# Patient Record
Sex: FEMALE | Race: WHITE | NOT HISPANIC OR LATINO | Employment: FULL TIME | URBAN - METROPOLITAN AREA
[De-identification: names, ages, dates, MRNs, and addresses within clinical notes are randomized per-mention and may not be internally consistent; named-entity substitution may affect disease eponyms.]

---

## 2017-01-21 ENCOUNTER — HOSPITAL ENCOUNTER (EMERGENCY)
Facility: HOSPITAL | Age: 32
Discharge: HOME | End: 2017-01-21
Attending: EMERGENCY MEDICINE | Admitting: EMERGENCY MEDICINE
Payer: COMMERCIAL

## 2017-01-21 ENCOUNTER — OFFICE VISIT (OUTPATIENT)
Dept: URGENT CARE | Facility: MEDICAL CENTER | Age: 32
End: 2017-01-21
Payer: COMMERCIAL

## 2017-01-21 ENCOUNTER — APPOINTMENT (EMERGENCY)
Dept: CT IMAGING | Facility: HOSPITAL | Age: 32
End: 2017-01-21
Payer: COMMERCIAL

## 2017-01-21 VITALS
DIASTOLIC BLOOD PRESSURE: 82 MMHG | BODY MASS INDEX: 29.97 KG/M2 | SYSTOLIC BLOOD PRESSURE: 142 MMHG | OXYGEN SATURATION: 96 % | RESPIRATION RATE: 16 BRPM | WEIGHT: 180.12 LBS | HEART RATE: 85 BPM | TEMPERATURE: 98.6 F

## 2017-01-21 DIAGNOSIS — N39.0 URINARY TRACT INFECTION: Primary | ICD-10-CM

## 2017-01-21 LAB
ALBUMIN SERPL BCP-MCNC: 4 G/DL (ref 3.5–5)
ALP SERPL-CCNC: 110 U/L (ref 46–116)
ALT SERPL W P-5'-P-CCNC: 52 U/L (ref 12–78)
ANION GAP SERPL CALCULATED.3IONS-SCNC: 13 MMOL/L (ref 4–13)
AST SERPL W P-5'-P-CCNC: 28 U/L (ref 5–45)
BACTERIA UR QL AUTO: ABNORMAL /HPF
BASOPHILS # BLD AUTO: 0.05 THOUSANDS/ΜL (ref 0–0.1)
BASOPHILS NFR BLD AUTO: 0 % (ref 0–1)
BILIRUB SERPL-MCNC: 0.3 MG/DL (ref 0.2–1)
BILIRUB UR QL STRIP: NEGATIVE
BUN SERPL-MCNC: 16 MG/DL (ref 5–25)
CALCIUM SERPL-MCNC: 8.9 MG/DL (ref 8.3–10.1)
CHLORIDE SERPL-SCNC: 104 MMOL/L (ref 100–108)
CLARITY UR: CLEAR
CLARITY, POC: CLEAR
CO2 SERPL-SCNC: 24 MMOL/L (ref 21–32)
COLOR UR: YELLOW
COLOR, POC: YELLOW
CREAT SERPL-MCNC: 0.77 MG/DL (ref 0.6–1.3)
EOSINOPHIL # BLD AUTO: 0.06 THOUSAND/ΜL (ref 0–0.61)
EOSINOPHIL NFR BLD AUTO: 1 % (ref 0–6)
ERYTHROCYTE [DISTWIDTH] IN BLOOD BY AUTOMATED COUNT: 12.6 % (ref 11.6–15.1)
EXT BILIRUBIN, UA: NEGATIVE
EXT BLOOD URINE: NORMAL
EXT GLUCOSE, UA: NEGATIVE
EXT KETONES: NEGATIVE
EXT NITRITE, UA: NEGATIVE
EXT PH, UA: 7
EXT PROTEIN, UA: NEGATIVE
EXT SPECIFIC GRAVITY, UA: 1
EXT UROBILINOGEN: 0.02
GFR SERPL CREATININE-BSD FRML MDRD: >60 ML/MIN/1.73SQ M
GLUCOSE SERPL-MCNC: 100 MG/DL (ref 65–140)
GLUCOSE UR STRIP-MCNC: NEGATIVE MG/DL
HCG UR QL: NEGATIVE
HCT VFR BLD AUTO: 38.7 % (ref 34.8–46.1)
HGB BLD-MCNC: 13 G/DL (ref 11.5–15.4)
HGB UR QL STRIP.AUTO: ABNORMAL
KETONES UR STRIP-MCNC: NEGATIVE MG/DL
LEUKOCYTE ESTERASE UR QL STRIP: ABNORMAL
LIPASE SERPL-CCNC: 118 U/L (ref 73–393)
LYMPHOCYTES # BLD AUTO: 1.71 THOUSANDS/ΜL (ref 0.6–4.47)
LYMPHOCYTES NFR BLD AUTO: 14 % (ref 14–44)
MCH RBC QN AUTO: 28.2 PG (ref 26.8–34.3)
MCHC RBC AUTO-ENTMCNC: 33.6 G/DL (ref 31.4–37.4)
MCV RBC AUTO: 84 FL (ref 82–98)
MONOCYTES # BLD AUTO: 1.11 THOUSAND/ΜL (ref 0.17–1.22)
MONOCYTES NFR BLD AUTO: 9 % (ref 4–12)
NEUTROPHILS # BLD AUTO: 9.04 THOUSANDS/ΜL (ref 1.85–7.62)
NEUTS SEG NFR BLD AUTO: 76 % (ref 43–75)
NITRITE UR QL STRIP: NEGATIVE
NON-SQ EPI CELLS URNS QL MICRO: ABNORMAL /HPF
PH UR STRIP.AUTO: 6.5 [PH] (ref 4.5–8)
PLATELET # BLD AUTO: 274 THOUSANDS/UL (ref 149–390)
PMV BLD AUTO: 10.7 FL (ref 8.9–12.7)
POTASSIUM SERPL-SCNC: 3.8 MMOL/L (ref 3.5–5.3)
PROT SERPL-MCNC: 7.9 G/DL (ref 6.4–8.2)
PROT UR STRIP-MCNC: NEGATIVE MG/DL
RBC # BLD AUTO: 4.61 MILLION/UL (ref 3.81–5.12)
RBC #/AREA URNS AUTO: ABNORMAL /HPF
SODIUM SERPL-SCNC: 141 MMOL/L (ref 136–145)
SP GR UR STRIP.AUTO: <=1.005 (ref 1–1.03)
UROBILINOGEN UR QL STRIP.AUTO: 0.2 E.U./DL
WBC # BLD AUTO: 11.97 THOUSAND/UL (ref 4.31–10.16)
WBC # BLD EST: NORMAL 10*3/UL
WBC #/AREA URNS AUTO: ABNORMAL /HPF

## 2017-01-21 PROCEDURE — 81025 URINE PREGNANCY TEST: CPT | Performed by: PHYSICIAN ASSISTANT

## 2017-01-21 PROCEDURE — 99285 EMERGENCY DEPT VISIT HI MDM: CPT

## 2017-01-21 PROCEDURE — 96361 HYDRATE IV INFUSION ADD-ON: CPT

## 2017-01-21 PROCEDURE — 74176 CT ABD & PELVIS W/O CONTRAST: CPT

## 2017-01-21 PROCEDURE — 36415 COLL VENOUS BLD VENIPUNCTURE: CPT | Performed by: PHYSICIAN ASSISTANT

## 2017-01-21 PROCEDURE — 83690 ASSAY OF LIPASE: CPT | Performed by: PHYSICIAN ASSISTANT

## 2017-01-21 PROCEDURE — 80053 COMPREHEN METABOLIC PANEL: CPT | Performed by: PHYSICIAN ASSISTANT

## 2017-01-21 PROCEDURE — 81002 URINALYSIS NONAUTO W/O SCOPE: CPT | Performed by: PHYSICIAN ASSISTANT

## 2017-01-21 PROCEDURE — 85025 COMPLETE CBC W/AUTO DIFF WBC: CPT | Performed by: PHYSICIAN ASSISTANT

## 2017-01-21 PROCEDURE — 96360 HYDRATION IV INFUSION INIT: CPT

## 2017-01-21 PROCEDURE — 87086 URINE CULTURE/COLONY COUNT: CPT | Performed by: EMERGENCY MEDICINE

## 2017-01-21 PROCEDURE — 81001 URINALYSIS AUTO W/SCOPE: CPT | Performed by: PHYSICIAN ASSISTANT

## 2017-01-21 RX ORDER — CEPHALEXIN 500 MG/1
500 CAPSULE ORAL 3 TIMES DAILY
Qty: 21 CAPSULE | Refills: 0 | Status: SHIPPED | OUTPATIENT
Start: 2017-01-21 | End: 2017-01-28

## 2017-01-21 RX ADMIN — SODIUM CHLORIDE 1000 ML: 0.9 INJECTION, SOLUTION INTRAVENOUS at 16:53

## 2017-01-23 ENCOUNTER — ALLSCRIPTS OFFICE VISIT (OUTPATIENT)
Dept: OTHER | Facility: OTHER | Age: 32
End: 2017-01-23

## 2017-01-23 LAB — BACTERIA UR CULT: NORMAL

## 2017-01-24 ENCOUNTER — APPOINTMENT (EMERGENCY)
Dept: CT IMAGING | Facility: HOSPITAL | Age: 32
End: 2017-01-24
Payer: COMMERCIAL

## 2017-01-24 ENCOUNTER — HOSPITAL ENCOUNTER (EMERGENCY)
Facility: HOSPITAL | Age: 32
Discharge: HOME | End: 2017-01-24
Attending: EMERGENCY MEDICINE | Admitting: EMERGENCY MEDICINE
Payer: COMMERCIAL

## 2017-01-24 VITALS
RESPIRATION RATE: 18 BRPM | DIASTOLIC BLOOD PRESSURE: 80 MMHG | OXYGEN SATURATION: 97 % | HEART RATE: 86 BPM | BODY MASS INDEX: 28.46 KG/M2 | TEMPERATURE: 98.2 F | WEIGHT: 171 LBS | SYSTOLIC BLOOD PRESSURE: 127 MMHG

## 2017-01-24 DIAGNOSIS — R11.0 NAUSEA: Primary | ICD-10-CM

## 2017-01-24 DIAGNOSIS — R10.9 RIGHT FLANK PAIN: ICD-10-CM

## 2017-01-24 DIAGNOSIS — R19.7 DIARRHEA: ICD-10-CM

## 2017-01-24 DIAGNOSIS — N83.201 OVARIAN CYST, RIGHT: ICD-10-CM

## 2017-01-24 LAB
ANION GAP SERPL CALCULATED.3IONS-SCNC: 11 MMOL/L (ref 4–13)
BACTERIA UR QL AUTO: ABNORMAL /HPF
BASOPHILS # BLD AUTO: 0.08 THOUSANDS/ΜL (ref 0–0.1)
BASOPHILS NFR BLD AUTO: 1 % (ref 0–1)
BILIRUB UR QL STRIP: NEGATIVE
BUN SERPL-MCNC: 14 MG/DL (ref 5–25)
CALCIUM SERPL-MCNC: 8.6 MG/DL (ref 8.3–10.1)
CHLORIDE SERPL-SCNC: 104 MMOL/L (ref 100–108)
CLARITY UR: CLEAR
CO2 SERPL-SCNC: 24 MMOL/L (ref 21–32)
COLOR UR: YELLOW
CREAT SERPL-MCNC: 0.71 MG/DL (ref 0.6–1.3)
EOSINOPHIL # BLD AUTO: 0.28 THOUSAND/ΜL (ref 0–0.61)
EOSINOPHIL NFR BLD AUTO: 3 % (ref 0–6)
ERYTHROCYTE [DISTWIDTH] IN BLOOD BY AUTOMATED COUNT: 12.9 % (ref 11.6–15.1)
GFR SERPL CREATININE-BSD FRML MDRD: >60 ML/MIN/1.73SQ M
GLUCOSE SERPL-MCNC: 101 MG/DL (ref 65–140)
GLUCOSE UR STRIP-MCNC: NEGATIVE MG/DL
HCT VFR BLD AUTO: 45.2 % (ref 34.8–46.1)
HGB BLD-MCNC: 15.3 G/DL (ref 11.5–15.4)
HGB UR QL STRIP.AUTO: ABNORMAL
KETONES UR STRIP-MCNC: NEGATIVE MG/DL
LEUKOCYTE ESTERASE UR QL STRIP: NEGATIVE
LYMPHOCYTES # BLD AUTO: 1.62 THOUSANDS/ΜL (ref 0.6–4.47)
LYMPHOCYTES NFR BLD AUTO: 19 % (ref 14–44)
MCH RBC QN AUTO: 28 PG (ref 26.8–34.3)
MCHC RBC AUTO-ENTMCNC: 33.8 G/DL (ref 31.4–37.4)
MCV RBC AUTO: 83 FL (ref 82–98)
MONOCYTES # BLD AUTO: 1.16 THOUSAND/ΜL (ref 0.17–1.22)
MONOCYTES NFR BLD AUTO: 14 % (ref 4–12)
NEUTROPHILS # BLD AUTO: 5.3 THOUSANDS/ΜL (ref 1.85–7.62)
NEUTS SEG NFR BLD AUTO: 63 % (ref 43–75)
NITRITE UR QL STRIP: NEGATIVE
NON-SQ EPI CELLS URNS QL MICRO: ABNORMAL /HPF
PH UR STRIP.AUTO: 5.5 [PH] (ref 4.5–8)
PLATELET # BLD AUTO: 285 THOUSANDS/UL (ref 149–390)
PMV BLD AUTO: 10.7 FL (ref 8.9–12.7)
POTASSIUM SERPL-SCNC: 3.4 MMOL/L (ref 3.5–5.3)
PROT UR STRIP-MCNC: ABNORMAL MG/DL
RBC # BLD AUTO: 5.46 MILLION/UL (ref 3.81–5.12)
RBC #/AREA URNS AUTO: ABNORMAL /HPF
SODIUM SERPL-SCNC: 139 MMOL/L (ref 136–145)
SP GR UR STRIP.AUTO: >=1.03 (ref 1–1.03)
UROBILINOGEN UR QL STRIP.AUTO: 0.2 E.U./DL
WBC # BLD AUTO: 8.44 THOUSAND/UL (ref 4.31–10.16)
WBC #/AREA URNS AUTO: ABNORMAL /HPF

## 2017-01-24 PROCEDURE — 87086 URINE CULTURE/COLONY COUNT: CPT | Performed by: EMERGENCY MEDICINE

## 2017-01-24 PROCEDURE — 36415 COLL VENOUS BLD VENIPUNCTURE: CPT | Performed by: EMERGENCY MEDICINE

## 2017-01-24 PROCEDURE — 85025 COMPLETE CBC W/AUTO DIFF WBC: CPT | Performed by: EMERGENCY MEDICINE

## 2017-01-24 PROCEDURE — 81001 URINALYSIS AUTO W/SCOPE: CPT | Performed by: EMERGENCY MEDICINE

## 2017-01-24 PROCEDURE — 99284 EMERGENCY DEPT VISIT MOD MDM: CPT

## 2017-01-24 PROCEDURE — 80048 BASIC METABOLIC PNL TOTAL CA: CPT | Performed by: EMERGENCY MEDICINE

## 2017-01-24 PROCEDURE — 96360 HYDRATION IV INFUSION INIT: CPT

## 2017-01-24 PROCEDURE — 96361 HYDRATE IV INFUSION ADD-ON: CPT

## 2017-01-24 PROCEDURE — 74176 CT ABD & PELVIS W/O CONTRAST: CPT

## 2017-01-24 RX ORDER — ONDANSETRON 4 MG/1
4 TABLET, FILM COATED ORAL EVERY 8 HOURS PRN
Qty: 6 TABLET | Refills: 0 | Status: SHIPPED | OUTPATIENT
Start: 2017-01-24 | End: 2018-01-29 | Stop reason: HOSPADM

## 2017-01-24 RX ADMIN — SODIUM CHLORIDE 1000 ML: 0.9 INJECTION, SOLUTION INTRAVENOUS at 19:49

## 2017-01-25 LAB — BACTERIA UR CULT: NORMAL

## 2017-01-27 DIAGNOSIS — N83.209 CYST OF OVARY: ICD-10-CM

## 2017-02-01 ENCOUNTER — TRANSCRIBE ORDERS (OUTPATIENT)
Dept: ADMINISTRATIVE | Facility: HOSPITAL | Age: 32
End: 2017-02-01

## 2017-02-01 ENCOUNTER — HOSPITAL ENCOUNTER (OUTPATIENT)
Dept: ULTRASOUND IMAGING | Facility: HOSPITAL | Age: 32
Discharge: HOME | End: 2017-02-01
Attending: OBSTETRICS & GYNECOLOGY
Payer: COMMERCIAL

## 2017-02-01 DIAGNOSIS — N83.209 CYST OF OVARY: ICD-10-CM

## 2017-02-01 PROCEDURE — 76856 US EXAM PELVIC COMPLETE: CPT

## 2017-02-01 PROCEDURE — 76830 TRANSVAGINAL US NON-OB: CPT

## 2017-03-29 ENCOUNTER — ALLSCRIPTS OFFICE VISIT (OUTPATIENT)
Dept: OTHER | Facility: OTHER | Age: 32
End: 2017-03-29

## 2017-03-30 ENCOUNTER — ALLSCRIPTS OFFICE VISIT (OUTPATIENT)
Dept: OTHER | Facility: OTHER | Age: 32
End: 2017-03-30

## 2017-05-26 DIAGNOSIS — Z34.81 ENCOUNTER FOR SUPERVISION OF OTHER NORMAL PREGNANCY, FIRST TRIMESTER: ICD-10-CM

## 2017-05-27 ENCOUNTER — APPOINTMENT (OUTPATIENT)
Dept: LAB | Facility: CLINIC | Age: 32
End: 2017-05-27
Payer: COMMERCIAL

## 2017-05-27 DIAGNOSIS — Z34.81 ENCOUNTER FOR SUPERVISION OF OTHER NORMAL PREGNANCY, FIRST TRIMESTER: ICD-10-CM

## 2017-05-27 LAB
ABO GROUP BLD: NORMAL
B-HCG SERPL-ACNC: 433 MIU/ML
BLD GP AB SCN SERPL QL: NEGATIVE
PROGEST SERPL-MCNC: 19.2 NG/ML
RH BLD: NEGATIVE
SPECIMEN EXPIRATION DATE: NORMAL

## 2017-05-27 PROCEDURE — 86900 BLOOD TYPING SEROLOGIC ABO: CPT

## 2017-05-27 PROCEDURE — 84702 CHORIONIC GONADOTROPIN TEST: CPT

## 2017-05-27 PROCEDURE — 36415 COLL VENOUS BLD VENIPUNCTURE: CPT

## 2017-05-27 PROCEDURE — 86901 BLOOD TYPING SEROLOGIC RH(D): CPT

## 2017-05-27 PROCEDURE — 84144 ASSAY OF PROGESTERONE: CPT

## 2017-05-27 PROCEDURE — 86850 RBC ANTIBODY SCREEN: CPT

## 2017-05-30 ENCOUNTER — APPOINTMENT (OUTPATIENT)
Dept: LAB | Facility: CLINIC | Age: 32
End: 2017-05-30
Payer: COMMERCIAL

## 2017-05-30 ENCOUNTER — TRANSCRIBE ORDERS (OUTPATIENT)
Dept: LAB | Facility: CLINIC | Age: 32
End: 2017-05-30

## 2017-05-30 DIAGNOSIS — Z34.81 ENCOUNTER FOR SUPERVISION OF OTHER NORMAL PREGNANCY, FIRST TRIMESTER: ICD-10-CM

## 2017-05-30 LAB — B-HCG SERPL-ACNC: 1746 MIU/ML

## 2017-05-30 PROCEDURE — 84702 CHORIONIC GONADOTROPIN TEST: CPT

## 2017-06-21 ENCOUNTER — ALLSCRIPTS OFFICE VISIT (OUTPATIENT)
Dept: OTHER | Facility: OTHER | Age: 32
End: 2017-06-21

## 2017-06-21 LAB
EXTERNAL CHLAMYDIA SCREEN: NORMAL
EXTERNAL GONORRHEA SCREEN: NORMAL

## 2017-06-24 ENCOUNTER — LAB CONVERSION - ENCOUNTER (OUTPATIENT)
Dept: OTHER | Facility: OTHER | Age: 32
End: 2017-06-24

## 2017-06-24 LAB — CULT RSLT GENITAL (HISTORICAL): NORMAL

## 2017-06-26 ENCOUNTER — LAB CONVERSION - ENCOUNTER (OUTPATIENT)
Dept: OTHER | Facility: OTHER | Age: 32
End: 2017-06-26

## 2017-06-26 LAB
CHLAMYDIA, LIQUID-BASED (HISTORICAL): NOT DETECTED
GC BY MOL. METHOD (HISTORICAL): NOT DETECTED
HPV 18 (HISTORICAL): NOT DETECTED
HPV HIGH RISK 16/18 (HISTORICAL): NOT DETECTED
HPV16 (HISTORICAL): NOT DETECTED
PAP (HISTORICAL): NORMAL
TRICHOMONAS (HISTORICAL): NOT DETECTED

## 2017-07-01 ENCOUNTER — APPOINTMENT (OUTPATIENT)
Dept: LAB | Facility: CLINIC | Age: 32
End: 2017-07-01
Payer: COMMERCIAL

## 2017-07-01 ENCOUNTER — TRANSCRIBE ORDERS (OUTPATIENT)
Dept: LAB | Facility: CLINIC | Age: 32
End: 2017-07-01

## 2017-07-01 DIAGNOSIS — Z34.81 ENCOUNTER FOR SUPERVISION OF OTHER NORMAL PREGNANCY, FIRST TRIMESTER: ICD-10-CM

## 2017-07-01 DIAGNOSIS — Z34.81 PRENATAL CARE, SUBSEQUENT PREGNANCY, FIRST TRIMESTER: Primary | ICD-10-CM

## 2017-07-01 LAB
ABO GROUP BLD: NORMAL
ALBUMIN SERPL BCP-MCNC: 3.6 G/DL (ref 3.5–5)
ALP SERPL-CCNC: 103 U/L (ref 46–116)
ALT SERPL W P-5'-P-CCNC: 45 U/L (ref 12–78)
ANION GAP SERPL CALCULATED.3IONS-SCNC: 11 MMOL/L (ref 4–13)
AST SERPL W P-5'-P-CCNC: 23 U/L (ref 5–45)
BASOPHILS # BLD AUTO: 0.04 THOUSANDS/ΜL (ref 0–0.1)
BASOPHILS NFR BLD AUTO: 1 % (ref 0–1)
BILIRUB SERPL-MCNC: 0.4 MG/DL (ref 0.2–1)
BILIRUB UR QL STRIP: NEGATIVE
BLD GP AB SCN SERPL QL: NEGATIVE
BUN SERPL-MCNC: 10 MG/DL (ref 5–25)
CALCIUM SERPL-MCNC: 9 MG/DL (ref 8.3–10.1)
CFTR MUT ANL BLD/T: NORMAL
CHLORIDE SERPL-SCNC: 100 MMOL/L (ref 100–108)
CLARITY UR: CLEAR
CO2 SERPL-SCNC: 25 MMOL/L (ref 21–32)
COLOR UR: COLORLESS
CREAT SERPL-MCNC: 0.75 MG/DL (ref 0.6–1.3)
CREAT UR-MCNC: <13 MG/DL
EOSINOPHIL # BLD AUTO: 0.03 THOUSAND/ΜL (ref 0–0.61)
EOSINOPHIL NFR BLD AUTO: 0 % (ref 0–6)
ERYTHROCYTE [DISTWIDTH] IN BLOOD BY AUTOMATED COUNT: 13.1 % (ref 11.6–15.1)
GFR SERPL CREATININE-BSD FRML MDRD: >60 ML/MIN/1.73SQ M
GLUCOSE 1H P 50 G GLC PO SERPL-MCNC: 140 MG/DL
GLUCOSE SERPL-MCNC: 141 MG/DL (ref 65–140)
GLUCOSE UR STRIP-MCNC: NEGATIVE MG/DL
HCT VFR BLD AUTO: 39.7 % (ref 34.8–46.1)
HGB BLD-MCNC: 13.5 G/DL (ref 11.5–15.4)
HGB UR QL STRIP.AUTO: NEGATIVE
KETONES UR STRIP-MCNC: NEGATIVE MG/DL
LEUKOCYTE ESTERASE UR QL STRIP: NEGATIVE
LYMPHOCYTES # BLD AUTO: 1.48 THOUSANDS/ΜL (ref 0.6–4.47)
LYMPHOCYTES NFR BLD AUTO: 18 % (ref 14–44)
MCH RBC QN AUTO: 28.5 PG (ref 26.8–34.3)
MCHC RBC AUTO-ENTMCNC: 34 G/DL (ref 31.4–37.4)
MCV RBC AUTO: 84 FL (ref 82–98)
MONOCYTES # BLD AUTO: 0.54 THOUSAND/ΜL (ref 0.17–1.22)
MONOCYTES NFR BLD AUTO: 7 % (ref 4–12)
NEUTROPHILS # BLD AUTO: 6.08 THOUSANDS/ΜL (ref 1.85–7.62)
NEUTS SEG NFR BLD AUTO: 74 % (ref 43–75)
NITRITE UR QL STRIP: NEGATIVE
PH UR STRIP.AUTO: 6.5 [PH] (ref 4.5–8)
PLATELET # BLD AUTO: 259 THOUSANDS/UL (ref 149–390)
PMV BLD AUTO: 10.8 FL (ref 8.9–12.7)
POTASSIUM SERPL-SCNC: 3.3 MMOL/L (ref 3.5–5.3)
PROT SERPL-MCNC: 7.8 G/DL (ref 6.4–8.2)
PROT UR STRIP-MCNC: NEGATIVE MG/DL
PROT UR-MCNC: <6 MG/DL
RBC # BLD AUTO: 4.73 MILLION/UL (ref 3.81–5.12)
RH BLD: NEGATIVE
RUBV IGG SERPL IA-ACNC: 61.6 IU/ML
SODIUM SERPL-SCNC: 136 MMOL/L (ref 136–145)
SP GR UR STRIP.AUTO: <=1.005 (ref 1–1.03)
SPECIMEN EXPIRATION DATE: NORMAL
URATE SERPL-MCNC: 3.3 MG/DL (ref 2–6.8)
UROBILINOGEN UR QL STRIP.AUTO: 0.2 E.U./DL
WBC # BLD AUTO: 8.17 THOUSAND/UL (ref 4.31–10.16)

## 2017-07-01 PROCEDURE — 80081 OBSTETRIC PANEL INC HIV TSTG: CPT

## 2017-07-01 PROCEDURE — 36415 COLL VENOUS BLD VENIPUNCTURE: CPT

## 2017-07-01 PROCEDURE — 84156 ASSAY OF PROTEIN URINE: CPT

## 2017-07-01 PROCEDURE — 86803 HEPATITIS C AB TEST: CPT

## 2017-07-01 PROCEDURE — 81003 URINALYSIS AUTO W/O SCOPE: CPT

## 2017-07-01 PROCEDURE — 82570 ASSAY OF URINE CREATININE: CPT

## 2017-07-01 PROCEDURE — 84550 ASSAY OF BLOOD/URIC ACID: CPT

## 2017-07-01 PROCEDURE — 82950 GLUCOSE TEST: CPT

## 2017-07-01 PROCEDURE — 80053 COMPREHEN METABOLIC PANEL: CPT

## 2017-07-02 LAB
HBV SURFACE AG SER QL: NORMAL
HCV AB SER QL: NORMAL

## 2017-07-03 LAB
HIV 1+2 AB+HIV1 P24 AG SERPL QL IA: NORMAL
RPR SER QL: NORMAL

## 2017-07-05 DIAGNOSIS — Z34.81 ENCOUNTER FOR SUPERVISION OF OTHER NORMAL PREGNANCY, FIRST TRIMESTER: ICD-10-CM

## 2017-07-15 ENCOUNTER — APPOINTMENT (OUTPATIENT)
Dept: LAB | Facility: CLINIC | Age: 32
End: 2017-07-15
Payer: COMMERCIAL

## 2017-07-15 DIAGNOSIS — Z34.81 ENCOUNTER FOR SUPERVISION OF OTHER NORMAL PREGNANCY, FIRST TRIMESTER: ICD-10-CM

## 2017-07-15 LAB
GLUCOSE 1H P 100 G GLC PO SERPL-MCNC: 121 MG/DL (ref 65–179)
GLUCOSE 2H P 100 G GLC PO SERPL-MCNC: 153 MG/DL (ref 65–154)
GLUCOSE 3H P 100 G GLC PO SERPL-MCNC: 70 MG/DL (ref 65–139)
GLUCOSE P FAST SERPL-MCNC: 88 MG/DL (ref 65–99)

## 2017-07-15 PROCEDURE — 82952 GTT-ADDED SAMPLES: CPT

## 2017-07-15 PROCEDURE — 36415 COLL VENOUS BLD VENIPUNCTURE: CPT

## 2017-07-15 PROCEDURE — 82951 GLUCOSE TOLERANCE TEST (GTT): CPT

## 2017-07-15 PROCEDURE — 82948 REAGENT STRIP/BLOOD GLUCOSE: CPT

## 2017-07-17 LAB — GLUCOSE SERPL-MCNC: 91 MG/DL (ref 65–140)

## 2017-07-24 ENCOUNTER — GENERIC CONVERSION - ENCOUNTER (OUTPATIENT)
Dept: OTHER | Facility: OTHER | Age: 32
End: 2017-07-24

## 2017-08-21 ENCOUNTER — GENERIC CONVERSION - ENCOUNTER (OUTPATIENT)
Dept: OTHER | Facility: OTHER | Age: 32
End: 2017-08-21

## 2017-09-06 ENCOUNTER — GENERIC CONVERSION - ENCOUNTER (OUTPATIENT)
Dept: OTHER | Facility: OTHER | Age: 32
End: 2017-09-06

## 2017-09-14 ENCOUNTER — ALLSCRIPTS OFFICE VISIT (OUTPATIENT)
Dept: PERINATAL CARE | Facility: CLINIC | Age: 32
End: 2017-09-14
Payer: COMMERCIAL

## 2017-09-14 ENCOUNTER — GENERIC CONVERSION - ENCOUNTER (OUTPATIENT)
Dept: OTHER | Facility: OTHER | Age: 32
End: 2017-09-14

## 2017-09-14 PROCEDURE — 76817 TRANSVAGINAL US OBSTETRIC: CPT | Performed by: OBSTETRICS & GYNECOLOGY

## 2017-09-14 PROCEDURE — 76811 OB US DETAILED SNGL FETUS: CPT | Performed by: OBSTETRICS & GYNECOLOGY

## 2017-09-21 ENCOUNTER — GENERIC CONVERSION - ENCOUNTER (OUTPATIENT)
Dept: OTHER | Facility: OTHER | Age: 32
End: 2017-09-21

## 2017-10-18 ENCOUNTER — GENERIC CONVERSION - ENCOUNTER (OUTPATIENT)
Dept: OTHER | Facility: OTHER | Age: 32
End: 2017-10-18

## 2017-10-18 DIAGNOSIS — Z34.82 ENCOUNTER FOR SUPERVISION OF OTHER NORMAL PREGNANCY, SECOND TRIMESTER: ICD-10-CM

## 2017-10-18 DIAGNOSIS — Z34.92 ENCOUNTER FOR SUPERVISION OF NORMAL PREGNANCY IN SECOND TRIMESTER: ICD-10-CM

## 2017-10-21 ENCOUNTER — APPOINTMENT (OUTPATIENT)
Dept: LAB | Facility: CLINIC | Age: 32
End: 2017-10-21
Payer: COMMERCIAL

## 2017-10-21 ENCOUNTER — TRANSCRIBE ORDERS (OUTPATIENT)
Dept: LAB | Facility: CLINIC | Age: 32
End: 2017-10-21

## 2017-10-21 DIAGNOSIS — Z34.82 ENCOUNTER FOR SUPERVISION OF OTHER NORMAL PREGNANCY, SECOND TRIMESTER: ICD-10-CM

## 2017-10-21 LAB
ABO GROUP BLD: NORMAL
BASOPHILS # BLD AUTO: 0.06 THOUSANDS/ΜL (ref 0–0.1)
BASOPHILS NFR BLD AUTO: 1 % (ref 0–1)
BLD GP AB SCN SERPL QL: NEGATIVE
EOSINOPHIL # BLD AUTO: 0.09 THOUSAND/ΜL (ref 0–0.61)
EOSINOPHIL NFR BLD AUTO: 1 % (ref 0–6)
ERYTHROCYTE [DISTWIDTH] IN BLOOD BY AUTOMATED COUNT: 13 % (ref 11.6–15.1)
GLUCOSE 1H P 50 G GLC PO SERPL-MCNC: 135 MG/DL
HCT VFR BLD AUTO: 34.9 % (ref 34.8–46.1)
HGB BLD-MCNC: 11.6 G/DL (ref 11.5–15.4)
LYMPHOCYTES # BLD AUTO: 1.6 THOUSANDS/ΜL (ref 0.6–4.47)
LYMPHOCYTES NFR BLD AUTO: 15 % (ref 14–44)
MCH RBC QN AUTO: 29.4 PG (ref 26.8–34.3)
MCHC RBC AUTO-ENTMCNC: 33.2 G/DL (ref 31.4–37.4)
MCV RBC AUTO: 89 FL (ref 82–98)
MONOCYTES # BLD AUTO: 0.78 THOUSAND/ΜL (ref 0.17–1.22)
MONOCYTES NFR BLD AUTO: 7 % (ref 4–12)
NEUTROPHILS # BLD AUTO: 8.29 THOUSANDS/ΜL (ref 1.85–7.62)
NEUTS SEG NFR BLD AUTO: 76 % (ref 43–75)
PLATELET # BLD AUTO: 276 THOUSANDS/UL (ref 149–390)
PMV BLD AUTO: 10.4 FL (ref 8.9–12.7)
RBC # BLD AUTO: 3.94 MILLION/UL (ref 3.81–5.12)
RH BLD: NEGATIVE
SPECIMEN EXPIRATION DATE: NORMAL
WBC # BLD AUTO: 10.82 THOUSAND/UL (ref 4.31–10.16)

## 2017-10-21 PROCEDURE — 85025 COMPLETE CBC W/AUTO DIFF WBC: CPT

## 2017-10-21 PROCEDURE — 86900 BLOOD TYPING SEROLOGIC ABO: CPT

## 2017-10-21 PROCEDURE — 86850 RBC ANTIBODY SCREEN: CPT

## 2017-10-21 PROCEDURE — 86901 BLOOD TYPING SEROLOGIC RH(D): CPT

## 2017-10-21 PROCEDURE — 36415 COLL VENOUS BLD VENIPUNCTURE: CPT

## 2017-10-21 PROCEDURE — 82950 GLUCOSE TEST: CPT

## 2017-10-25 ENCOUNTER — GENERIC CONVERSION - ENCOUNTER (OUTPATIENT)
Dept: OTHER | Facility: OTHER | Age: 32
End: 2017-10-25

## 2017-10-26 ENCOUNTER — APPOINTMENT (OUTPATIENT)
Dept: LAB | Facility: CLINIC | Age: 32
End: 2017-10-26
Payer: COMMERCIAL

## 2017-10-26 DIAGNOSIS — Z34.92 ENCOUNTER FOR SUPERVISION OF NORMAL PREGNANCY IN SECOND TRIMESTER: ICD-10-CM

## 2017-10-26 LAB
GLUCOSE 1H P 100 G GLC PO SERPL-MCNC: 115 MG/DL (ref 65–179)
GLUCOSE 2H P 100 G GLC PO SERPL-MCNC: 117 MG/DL (ref 65–154)
GLUCOSE 3H P 100 G GLC PO SERPL-MCNC: 67 MG/DL (ref 65–139)
GLUCOSE P FAST SERPL-MCNC: 93 MG/DL (ref 65–99)

## 2017-10-26 PROCEDURE — 36415 COLL VENOUS BLD VENIPUNCTURE: CPT

## 2017-10-26 PROCEDURE — 82948 REAGENT STRIP/BLOOD GLUCOSE: CPT

## 2017-10-26 PROCEDURE — 82951 GLUCOSE TOLERANCE TEST (GTT): CPT

## 2017-10-26 PROCEDURE — 82952 GTT-ADDED SAMPLES: CPT

## 2017-10-27 LAB — GLUCOSE SERPL-MCNC: 83 MG/DL (ref 65–140)

## 2017-11-10 ENCOUNTER — GENERIC CONVERSION - ENCOUNTER (OUTPATIENT)
Dept: OTHER | Facility: OTHER | Age: 32
End: 2017-11-10

## 2017-11-24 ENCOUNTER — GENERIC CONVERSION - ENCOUNTER (OUTPATIENT)
Dept: OTHER | Facility: OTHER | Age: 32
End: 2017-11-24

## 2017-12-06 ENCOUNTER — GENERIC CONVERSION - ENCOUNTER (OUTPATIENT)
Dept: OBGYN CLINIC | Facility: CLINIC | Age: 32
End: 2017-12-06

## 2017-12-07 ENCOUNTER — APPOINTMENT (OUTPATIENT)
Dept: PERINATAL CARE | Facility: CLINIC | Age: 32
End: 2017-12-07
Payer: COMMERCIAL

## 2017-12-07 ENCOUNTER — GENERIC CONVERSION - ENCOUNTER (OUTPATIENT)
Dept: OTHER | Facility: OTHER | Age: 32
End: 2017-12-07

## 2017-12-07 PROCEDURE — 76816 OB US FOLLOW-UP PER FETUS: CPT | Performed by: OBSTETRICS & GYNECOLOGY

## 2017-12-19 ENCOUNTER — APPOINTMENT (OUTPATIENT)
Dept: LAB | Facility: CLINIC | Age: 32
End: 2017-12-19
Payer: COMMERCIAL

## 2017-12-19 ENCOUNTER — ALLSCRIPTS OFFICE VISIT (OUTPATIENT)
Dept: OTHER | Facility: OTHER | Age: 32
End: 2017-12-19

## 2017-12-19 ENCOUNTER — TRANSCRIBE ORDERS (OUTPATIENT)
Dept: LAB | Facility: CLINIC | Age: 32
End: 2017-12-19

## 2017-12-19 DIAGNOSIS — Z34.83 ENCOUNTER FOR SUPERVISION OF OTHER NORMAL PREGNANCY, THIRD TRIMESTER: ICD-10-CM

## 2017-12-19 LAB
ALBUMIN SERPL BCP-MCNC: 2.6 G/DL (ref 3.5–5)
ALP SERPL-CCNC: 151 U/L (ref 46–116)
ALT SERPL W P-5'-P-CCNC: 34 U/L (ref 12–78)
ANION GAP SERPL CALCULATED.3IONS-SCNC: 11 MMOL/L (ref 4–13)
AST SERPL W P-5'-P-CCNC: 24 U/L (ref 5–45)
BACTERIA UR QL AUTO: ABNORMAL /HPF
BASOPHILS # BLD AUTO: 0.03 THOUSANDS/ΜL (ref 0–0.1)
BASOPHILS NFR BLD AUTO: 0 % (ref 0–1)
BILIRUB SERPL-MCNC: 0.4 MG/DL (ref 0.2–1)
BILIRUB UR QL STRIP: NEGATIVE
BUN SERPL-MCNC: 8 MG/DL (ref 5–25)
CALCIUM SERPL-MCNC: 8.5 MG/DL (ref 8.3–10.1)
CHLORIDE SERPL-SCNC: 103 MMOL/L (ref 100–108)
CLARITY UR: ABNORMAL
CO2 SERPL-SCNC: 23 MMOL/L (ref 21–32)
COLOR UR: YELLOW
CREAT SERPL-MCNC: 0.64 MG/DL (ref 0.6–1.3)
CREAT UR-MCNC: 36.3 MG/DL
EOSINOPHIL # BLD AUTO: 0.15 THOUSAND/ΜL (ref 0–0.61)
EOSINOPHIL NFR BLD AUTO: 1 % (ref 0–6)
ERYTHROCYTE [DISTWIDTH] IN BLOOD BY AUTOMATED COUNT: 13.3 % (ref 11.6–15.1)
GFR SERPL CREATININE-BSD FRML MDRD: 117 ML/MIN/1.73SQ M
GLUCOSE SERPL-MCNC: 77 MG/DL (ref 65–140)
GLUCOSE UR STRIP-MCNC: NEGATIVE MG/DL
HCT VFR BLD AUTO: 32.5 % (ref 34.8–46.1)
HGB BLD-MCNC: 10.6 G/DL (ref 11.5–15.4)
HGB UR QL STRIP.AUTO: NEGATIVE
KETONES UR STRIP-MCNC: ABNORMAL MG/DL
LEUKOCYTE ESTERASE UR QL STRIP: ABNORMAL
LYMPHOCYTES # BLD AUTO: 2.11 THOUSANDS/ΜL (ref 0.6–4.47)
LYMPHOCYTES NFR BLD AUTO: 17 % (ref 14–44)
MCH RBC QN AUTO: 28.5 PG (ref 26.8–34.3)
MCHC RBC AUTO-ENTMCNC: 32.6 G/DL (ref 31.4–37.4)
MCV RBC AUTO: 87 FL (ref 82–98)
MONOCYTES # BLD AUTO: 1.17 THOUSAND/ΜL (ref 0.17–1.22)
MONOCYTES NFR BLD AUTO: 10 % (ref 4–12)
NEUTROPHILS # BLD AUTO: 8.84 THOUSANDS/ΜL (ref 1.85–7.62)
NEUTS SEG NFR BLD AUTO: 72 % (ref 43–75)
NITRITE UR QL STRIP: NEGATIVE
NON-SQ EPI CELLS URNS QL MICRO: ABNORMAL /HPF
PH UR STRIP.AUTO: 6.5 [PH] (ref 4.5–8)
PLATELET # BLD AUTO: 293 THOUSANDS/UL (ref 149–390)
PMV BLD AUTO: 10.7 FL (ref 8.9–12.7)
POTASSIUM SERPL-SCNC: 3.9 MMOL/L (ref 3.5–5.3)
PROT SERPL-MCNC: 6.8 G/DL (ref 6.4–8.2)
PROT UR STRIP-MCNC: NEGATIVE MG/DL
PROT UR-MCNC: 8 MG/DL
PROT/CREAT UR: 0.22 MG/G{CREAT} (ref 0–0.1)
RBC # BLD AUTO: 3.72 MILLION/UL (ref 3.81–5.12)
RBC #/AREA URNS AUTO: ABNORMAL /HPF
SODIUM SERPL-SCNC: 137 MMOL/L (ref 136–145)
SP GR UR STRIP.AUTO: <=1.005 (ref 1–1.03)
URATE SERPL-MCNC: 3.7 MG/DL (ref 2–6.8)
UROBILINOGEN UR QL STRIP.AUTO: 0.2 E.U./DL
WBC # BLD AUTO: 12.3 THOUSAND/UL (ref 4.31–10.16)
WBC #/AREA URNS AUTO: ABNORMAL /HPF

## 2017-12-19 PROCEDURE — 82570 ASSAY OF URINE CREATININE: CPT

## 2017-12-19 PROCEDURE — 85025 COMPLETE CBC W/AUTO DIFF WBC: CPT

## 2017-12-19 PROCEDURE — 84156 ASSAY OF PROTEIN URINE: CPT

## 2017-12-19 PROCEDURE — 84550 ASSAY OF BLOOD/URIC ACID: CPT

## 2017-12-19 PROCEDURE — 80053 COMPREHEN METABOLIC PANEL: CPT

## 2017-12-19 PROCEDURE — 81001 URINALYSIS AUTO W/SCOPE: CPT

## 2017-12-19 PROCEDURE — 36415 COLL VENOUS BLD VENIPUNCTURE: CPT

## 2017-12-28 ENCOUNTER — GENERIC CONVERSION - ENCOUNTER (OUTPATIENT)
Dept: OTHER | Facility: OTHER | Age: 32
End: 2017-12-28

## 2017-12-28 ENCOUNTER — HOSPITAL ENCOUNTER (OUTPATIENT)
Facility: HOSPITAL | Age: 32
Discharge: HOME | End: 2017-12-28
Attending: OBSTETRICS & GYNECOLOGY | Admitting: OBSTETRICS & GYNECOLOGY
Payer: COMMERCIAL

## 2017-12-28 VITALS
DIASTOLIC BLOOD PRESSURE: 78 MMHG | TEMPERATURE: 97.7 F | RESPIRATION RATE: 18 BRPM | SYSTOLIC BLOOD PRESSURE: 147 MMHG | HEART RATE: 87 BPM

## 2017-12-28 DIAGNOSIS — Z3A.35 35 WEEKS GESTATION OF PREGNANCY: ICD-10-CM

## 2017-12-28 DIAGNOSIS — O16.3 HIGH BLOOD PRESSURE AFFECTING PREGNANCY IN THIRD TRIMESTER, ANTEPARTUM: ICD-10-CM

## 2017-12-28 LAB
ALBUMIN SERPL BCP-MCNC: 2.8 G/DL (ref 3.5–5)
ALP SERPL-CCNC: 160 U/L (ref 46–116)
ALT SERPL W P-5'-P-CCNC: 30 U/L (ref 12–78)
ANION GAP SERPL CALCULATED.3IONS-SCNC: 11 MMOL/L (ref 4–13)
AST SERPL W P-5'-P-CCNC: 17 U/L (ref 5–45)
BASOPHILS # BLD AUTO: 0.03 THOUSANDS/ΜL (ref 0–0.1)
BASOPHILS NFR BLD AUTO: 0 % (ref 0–1)
BILIRUB SERPL-MCNC: 0.33 MG/DL (ref 0.2–1)
BILIRUB UR QL STRIP: NEGATIVE
BUN SERPL-MCNC: 7 MG/DL (ref 5–25)
CALCIUM SERPL-MCNC: 8.9 MG/DL (ref 8.3–10.1)
CHLORIDE SERPL-SCNC: 106 MMOL/L (ref 100–108)
CLARITY UR: CLEAR
CO2 SERPL-SCNC: 22 MMOL/L (ref 21–32)
COLOR UR: YELLOW
CREAT SERPL-MCNC: 0.47 MG/DL (ref 0.6–1.3)
CREAT UR-MCNC: 37.6 MG/DL
EOSINOPHIL # BLD AUTO: 0.06 THOUSAND/ΜL (ref 0–0.61)
EOSINOPHIL NFR BLD AUTO: 1 % (ref 0–6)
ERYTHROCYTE [DISTWIDTH] IN BLOOD BY AUTOMATED COUNT: 13.3 % (ref 11.6–15.1)
GFR SERPL CREATININE-BSD FRML MDRD: 129 ML/MIN/1.73SQ M
GLUCOSE SERPL-MCNC: 81 MG/DL (ref 65–140)
GLUCOSE UR STRIP-MCNC: NEGATIVE MG/DL
HCT VFR BLD AUTO: 31.9 % (ref 34.8–46.1)
HGB BLD-MCNC: 10.7 G/DL (ref 11.5–15.4)
HGB UR QL STRIP.AUTO: NEGATIVE
KETONES UR STRIP-MCNC: ABNORMAL MG/DL
LEUKOCYTE ESTERASE UR QL STRIP: NEGATIVE
LYMPHOCYTES # BLD AUTO: 2.4 THOUSANDS/ΜL (ref 0.6–4.47)
LYMPHOCYTES NFR BLD AUTO: 18 % (ref 14–44)
MCH RBC QN AUTO: 28.7 PG (ref 26.8–34.3)
MCHC RBC AUTO-ENTMCNC: 33.5 G/DL (ref 31.4–37.4)
MCV RBC AUTO: 86 FL (ref 82–98)
MONOCYTES # BLD AUTO: 1.01 THOUSAND/ΜL (ref 0.17–1.22)
MONOCYTES NFR BLD AUTO: 8 % (ref 4–12)
NEUTROPHILS # BLD AUTO: 9.54 THOUSANDS/ΜL (ref 1.85–7.62)
NEUTS SEG NFR BLD AUTO: 73 % (ref 43–75)
NITRITE UR QL STRIP: NEGATIVE
NRBC BLD AUTO-RTO: 0 /100 WBCS
PH UR STRIP.AUTO: 7 [PH] (ref 4.5–8)
PLATELET # BLD AUTO: 241 THOUSANDS/UL (ref 149–390)
PMV BLD AUTO: 10.9 FL (ref 8.9–12.7)
POTASSIUM SERPL-SCNC: 3.5 MMOL/L (ref 3.5–5.3)
PROT SERPL-MCNC: 7.1 G/DL (ref 6.4–8.2)
PROT UR STRIP-MCNC: NEGATIVE MG/DL
PROT UR-MCNC: 9 MG/DL
PROT/CREAT UR: 0.24 MG/G{CREAT} (ref 0–0.1)
RBC # BLD AUTO: 3.73 MILLION/UL (ref 3.81–5.12)
SODIUM SERPL-SCNC: 139 MMOL/L (ref 136–145)
SP GR UR STRIP.AUTO: 1.01 (ref 1–1.03)
URATE SERPL-MCNC: 4 MG/DL (ref 2–6.8)
UROBILINOGEN UR QL STRIP.AUTO: 0.2 E.U./DL
WBC # BLD AUTO: 13.2 THOUSAND/UL (ref 4.31–10.16)

## 2017-12-28 PROCEDURE — 80053 COMPREHEN METABOLIC PANEL: CPT | Performed by: OBSTETRICS & GYNECOLOGY

## 2017-12-28 PROCEDURE — 82570 ASSAY OF URINE CREATININE: CPT | Performed by: OBSTETRICS & GYNECOLOGY

## 2017-12-28 PROCEDURE — 84550 ASSAY OF BLOOD/URIC ACID: CPT | Performed by: OBSTETRICS & GYNECOLOGY

## 2017-12-28 PROCEDURE — 99204 OFFICE O/P NEW MOD 45 MIN: CPT

## 2017-12-28 PROCEDURE — 81003 URINALYSIS AUTO W/O SCOPE: CPT | Performed by: OBSTETRICS & GYNECOLOGY

## 2017-12-28 PROCEDURE — G0463 HOSPITAL OUTPT CLINIC VISIT: HCPCS

## 2017-12-28 PROCEDURE — 84156 ASSAY OF PROTEIN URINE: CPT | Performed by: OBSTETRICS & GYNECOLOGY

## 2017-12-28 PROCEDURE — 85025 COMPLETE CBC W/AUTO DIFF WBC: CPT | Performed by: OBSTETRICS & GYNECOLOGY

## 2017-12-29 NOTE — PROGRESS NOTES
Triage Note - OB  Renee Graves 27 y o  female MRN: [id number]  Unit/Bed#: LD Triage 1- Encounter: [id number]    Chief Complaint:   Chief Complaint   Patient presents with    Hypertension     Pt sent from drs office for increased blood pressures  KAYLENE: Estimated Date of Delivery: 18    HPI: 27 y o  female  at 35w1d presents with elevated blood pressures from the office  No headaches, blurry vision, RUQ/epigastric pain, no increased swelling  Pt also notes URI sx with mild intermittent cough & congestion  Afebrile  +FM, no LOF, no VB, no ctx  Vitals: Blood pressure 147/78, pulse 87, temperature 97 7 °F (36 5 °C), temperature source Oral, resp  rate 18, last menstrual period 2017, currently breastfeeding  ,There is no height or weight on file to calculate BMI      Physical Exam  GEN: well developed and well nourished, alert, oriented times 3 and appears comfortable  Heart: Regular rate and rhythm, S1S2 present or without murmur or extra heart sounds   Lungs: clear to auscultation bilaterally, no wheezes, no rhonchi and normal symmetric air entry  Abd: soft, non tender and gravid  SVE: deferred    FHR: 140, cat I, reactive  [place]: Q4-10 min, irregular    Labs:   Admission on 2017   Component Date Value    WBC 2017 13 20*    RBC 2017 3 73*    Hemoglobin 2017 10 7*    Hematocrit 2017 31 9*    MCV 2017 86     MCH 2017 28 7     MCHC 2017 33 5     RDW 2017 13 3     MPV 2017 10 9     Platelets  241     nRBC 2017 0     Neutrophils Relative 2017 73     Lymphocytes Relative 2017 18     Monocytes Relative 2017 8     Eosinophils Relative 2017 1     Basophils Relative 2017 0     Neutrophils Absolute 2017 9 54*    Lymphocytes Absolute 2017 2 40     Monocytes Absolute 2017 1 01     Eosinophils Absolute 2017 0 06     Basophils Absolute 2017 0 03     Sodium 2017 139     Potassium 2017 3 5     Chloride 2017 106     CO2 2017 22     Anion Gap 2017 11     BUN 2017 7     Creatinine 2017 0 47*    Glucose 2017 81     Calcium 2017 8 9     AST 2017 17     ALT 2017 30     Alkaline Phosphatase 2017 160*    Total Protein 2017 7 1     Albumin 2017 2 8*    Total Bilirubin 2017 0 33     eGFR 2017 129     Uric Acid 2017 4 0     Color, UA 2017 Yellow     Clarity, UA 2017 Clear     Specific Gravity, UA 2017 1 009     pH, UA 2017 7 0     Leukocytes, UA 2017 Negative     Nitrite, UA 2017 Negative     Protein, UA 2017 Negative     Glucose, UA 2017 Negative     Ketones, UA 2017 40 (2+)*    Urobilinogen, UA 2017 0 2     Bilirubin, UA 2017 Negative     Blood, UA 2017 Negative     Creatinine, Ur 2017 37 6     Protein Urine Random 2017 9     Prot/Creat Ratio, Ur 2017 0 24*       Lab, Imaging and other studies: I have personally reviewed pertinent reports  A/P: 27 y o  female  at 35 1 wk, GHTN given persistent elevated BPs in pregnancy, no evidence of preeclampsia  1) GHTN- continue routine PN care with weekly BP checks  2) URI- no evidence of pulmonary involvement  Encouraged conservative measures including ocean spray nasal spray and dayquil/nightquil as needed  3) Discharge instructions given to patient and labor precautions reviewed   D/w Dr Baljinder Graves

## 2017-12-30 ENCOUNTER — LAB CONVERSION - ENCOUNTER (OUTPATIENT)
Dept: OTHER | Facility: OTHER | Age: 32
End: 2017-12-30

## 2017-12-30 LAB — STREP GRP B, MOLECULAR (HISTORICAL): NEGATIVE

## 2018-01-03 ENCOUNTER — GENERIC CONVERSION - ENCOUNTER (OUTPATIENT)
Dept: OTHER | Facility: OTHER | Age: 33
End: 2018-01-03

## 2018-01-09 ENCOUNTER — GENERIC CONVERSION - ENCOUNTER (OUTPATIENT)
Dept: OTHER | Facility: OTHER | Age: 33
End: 2018-01-09

## 2018-01-09 ENCOUNTER — APPOINTMENT (OUTPATIENT)
Dept: LAB | Facility: CLINIC | Age: 33
End: 2018-01-09
Payer: COMMERCIAL

## 2018-01-09 ENCOUNTER — TRANSCRIBE ORDERS (OUTPATIENT)
Dept: LAB | Facility: CLINIC | Age: 33
End: 2018-01-09

## 2018-01-09 DIAGNOSIS — Z34.83 ENCOUNTER FOR SUPERVISION OF OTHER NORMAL PREGNANCY, THIRD TRIMESTER: ICD-10-CM

## 2018-01-09 LAB
ALBUMIN SERPL BCP-MCNC: 2.5 G/DL (ref 3.5–5)
ALP SERPL-CCNC: 186 U/L (ref 46–116)
ALT SERPL W P-5'-P-CCNC: 26 U/L (ref 12–78)
ANION GAP SERPL CALCULATED.3IONS-SCNC: 11 MMOL/L (ref 4–13)
AST SERPL W P-5'-P-CCNC: 25 U/L (ref 5–45)
BASOPHILS # BLD AUTO: 0.03 THOUSANDS/ΜL (ref 0–0.1)
BASOPHILS NFR BLD AUTO: 0 % (ref 0–1)
BILIRUB SERPL-MCNC: 0.3 MG/DL (ref 0.2–1)
BUN SERPL-MCNC: 9 MG/DL (ref 5–25)
CALCIUM SERPL-MCNC: 8.8 MG/DL (ref 8.3–10.1)
CHLORIDE SERPL-SCNC: 104 MMOL/L (ref 100–108)
CO2 SERPL-SCNC: 22 MMOL/L (ref 21–32)
CREAT SERPL-MCNC: 0.61 MG/DL (ref 0.6–1.3)
CREAT UR-MCNC: 56.4 MG/DL
EOSINOPHIL # BLD AUTO: 0.06 THOUSAND/ΜL (ref 0–0.61)
EOSINOPHIL NFR BLD AUTO: 1 % (ref 0–6)
ERYTHROCYTE [DISTWIDTH] IN BLOOD BY AUTOMATED COUNT: 13.8 % (ref 11.6–15.1)
GFR SERPL CREATININE-BSD FRML MDRD: 119 ML/MIN/1.73SQ M
GLUCOSE SERPL-MCNC: 79 MG/DL (ref 65–140)
HCT VFR BLD AUTO: 33.8 % (ref 34.8–46.1)
HGB BLD-MCNC: 10.8 G/DL (ref 11.5–15.4)
LYMPHOCYTES # BLD AUTO: 1.68 THOUSANDS/ΜL (ref 0.6–4.47)
LYMPHOCYTES NFR BLD AUTO: 18 % (ref 14–44)
MCH RBC QN AUTO: 27.7 PG (ref 26.8–34.3)
MCHC RBC AUTO-ENTMCNC: 32 G/DL (ref 31.4–37.4)
MCV RBC AUTO: 87 FL (ref 82–98)
MONOCYTES # BLD AUTO: 0.92 THOUSAND/ΜL (ref 0.17–1.22)
MONOCYTES NFR BLD AUTO: 10 % (ref 4–12)
NEUTROPHILS # BLD AUTO: 6.68 THOUSANDS/ΜL (ref 1.85–7.62)
NEUTS SEG NFR BLD AUTO: 71 % (ref 43–75)
PLATELET # BLD AUTO: 237 THOUSANDS/UL (ref 149–390)
PMV BLD AUTO: 11.4 FL (ref 8.9–12.7)
POTASSIUM SERPL-SCNC: 4.3 MMOL/L (ref 3.5–5.3)
PROT SERPL-MCNC: 6.8 G/DL (ref 6.4–8.2)
PROT UR-MCNC: 14 MG/DL
PROT/CREAT UR: 0.25 MG/G{CREAT} (ref 0–0.1)
RBC # BLD AUTO: 3.9 MILLION/UL (ref 3.81–5.12)
SODIUM SERPL-SCNC: 137 MMOL/L (ref 136–145)
URATE SERPL-MCNC: 5 MG/DL (ref 2–6.8)
WBC # BLD AUTO: 9.37 THOUSAND/UL (ref 4.31–10.16)

## 2018-01-09 PROCEDURE — 36415 COLL VENOUS BLD VENIPUNCTURE: CPT

## 2018-01-09 PROCEDURE — 80053 COMPREHEN METABOLIC PANEL: CPT

## 2018-01-09 PROCEDURE — 84550 ASSAY OF BLOOD/URIC ACID: CPT

## 2018-01-09 PROCEDURE — 84156 ASSAY OF PROTEIN URINE: CPT

## 2018-01-09 PROCEDURE — 85025 COMPLETE CBC W/AUTO DIFF WBC: CPT

## 2018-01-09 PROCEDURE — 82570 ASSAY OF URINE CREATININE: CPT

## 2018-01-11 NOTE — PROGRESS NOTES
Assessment    1  Pap smear abnormality of cervix with ASCUS favoring benign (795 01) (R80 610)    Plan  Health Maintenance, Pap smear abnormality of cervix with ASCUS favoring benign    · Follow-up visit in 6 months Evaluation and Treatment  Follow-up  Status: Hold For -  Scheduling  Requested for: [id number]   Ordered; For: Health Maintenance, Pap smear abnormality of cervix with ASCUS favoring benign; Ordered By: Shanta Bustamante Performed:  Due: [id number]  Pap smear abnormality of cervix with ASCUS favoring benign    · (1) THIN PREP PAP FOLLOW UP WITH IMAGING; Status:Active; Requested  MVE:[id number];    Perform:[facility] Lab In Office Collection; Order Comments:REPAP; HR HPV; CDZ:[id number]; For:Pap smear abnormality of cervix with ASCUS favoring benign; Ordered By:Gwen Mehta;  Maturation index required? : No  Date? : 2016  HPV? : Regardless of Interpretation    Discussion/Summary  Discussion Summary:   Repeat pap done  f/u as indicated by results     Chief Complaint  Chief Complaint Free Text Note Form: abnormal pap   Chief Complaint Chronic Condition  Ngoc Lisa: Patient is here today for follow up of chronic conditions described in HPI  History of Present Illness  HPI: Here for follow up pap - had ASCUS with pos HPV 16 and colpo with RENÉE I - overall no complaints - reviewed possible f/u - if pap same then continue to f/u with APE - if worsening may need to review other options - briefly reviewed LEEP and post LEEP pregnancy f/u should that be an issue  All questions answered -      Review of Systems  Focused-Female:   Constitutional: No fever, no chills, feels well, no tiredness, no recent weight gain or loss  Cardiovascular: no complaints of slow or fast heart rate, no chest pain, no palpitations, no leg claudication or lower extremity edema  Respiratory: no complaints of shortness of breath, no wheezing, no dyspnea on exertion, no orthopnea or PND     Breasts: no complaints of breast pain, breast lump or nipple discharge  Gastrointestinal: no complaints of abdominal pain, no constipation, no nausea or diarrhea, no vomiting, no bloody stools  Genitourinary: no complaints of dysuria, no incontinence, no pelvic pain, no dysmenorrhea, no vaginal discharge or abnormal vaginal bleeding  Musculoskeletal: no complaints of arthralgia, no myalgia, no joint swelling or stiffness, no limb pain or swelling  Neurological: no complaints of headache, no confusion, no numbness or tingling, no dizziness or fainting  ROS Reviewed:   ROS reviewed  Active Problems    1  Acute bronchitis due to Mycoplasma pneumoniae (466 0,041 81) (J20 0)   2  Acute streptococcal pharyngitis (034 0) (J02 0)   3  Lump of right breast (611 72) (N63)   4  Milia of eyelid (374 84) (H02 829)   5  Seasonal allergic rhinitis (477 9) (J30 2)   6  Somatic dysfunction of thoracic region (739 2) (M99 02)   7  Sore throat (462) (J02 9)    Past Medical History    1  History of Abnormal Pap smear of vagina and vaginal HPV (795 19,079 4) (R89 6,B97 7)   2  History of Anxiety (300 00) (F41 9)   3  History of  1 para 1 (V49 89) (Z78 9)   4  History of hypertension (V12 59) (Z86 79)   5  History of pregnancy (V13 29)   6  History of Menarche (V21 8)   7  History of Visit for routine gyn exam (V72 31) (Z01 419)    Surgical History    1  History of Appendectomy   2  History of  Section   3  History of Colposcopy    Family History    1  Family history of basal cell carcinoma (V16 8) (Z80 8)   2  Family history of Hypercholesterolemia    3  Family history of Type 2 Diabetes Mellitus    4  Family history of Hypercholesterolemia    Social History    · Cultural background   ·    · Monogamous relationship   · Never A Smoker   · No drug use   · Occupation   · Primary spoken language English   · Racial background   · Social use   · Uses condoms    Current Meds   1   No Reported Medications  Requested for: [id number] Recorded    Allergies    1  No Known Drug Allergies    Vitals  Vital Signs [Data Includes: Current Encounter]    Recorded: 93DVQ8688 42:43MS   Systolic 518, LUE, Sitting   Diastolic 90, LUE, Sitting   Height 5 ft 5 in   Weight 161 lb    BMI Calculated 26 79   BSA Calculated 1 8     Physical Exam    Constitutional   General appearance: No acute distress, well appearing and well nourished  well nourished white female in NAD  Genitourinary   External genitalia: Normal and no lesions appreciated  Vagina: Normal, no lesions or dryness appreciated  Urethra: Normal     Urethral meatus: Normal     Cervix: Normal, no palpable masses  no friablilyt with pap  Psychiatric   Orientation to person, place, and time: Normal     Mood and affect: Normal        Future Appointments    Date/Time Provider Specialty Site   07/28/2016 04:15 PM ADRY Ortiz   Obstetrics/Gynecology [facility]   03/23/2016 03:45 PM Hay Andres MD Surgical Oncology [facility]     Signatures   Electronically signed by : Alyssa Dandy, M D ; Feb 1 2016  6:22PM EST                       (Author)

## 2018-01-12 ENCOUNTER — OB ABSTRACT (OUTPATIENT)
Dept: OBGYN CLINIC | Facility: CLINIC | Age: 33
End: 2018-01-12

## 2018-01-12 ENCOUNTER — GENERIC CONVERSION - ENCOUNTER (OUTPATIENT)
Dept: OTHER | Facility: OTHER | Age: 33
End: 2018-01-12

## 2018-01-13 VITALS
SYSTOLIC BLOOD PRESSURE: 118 MMHG | TEMPERATURE: 98.3 F | BODY MASS INDEX: 29.02 KG/M2 | OXYGEN SATURATION: 99 % | RESPIRATION RATE: 16 BRPM | HEIGHT: 65 IN | DIASTOLIC BLOOD PRESSURE: 80 MMHG | HEART RATE: 81 BPM | WEIGHT: 174.18 LBS

## 2018-01-13 NOTE — PROGRESS NOTES
SEP 14 2017         RE: Armin Herrmann                            To: [facility]   MR#: [id number]                                   [address]   :  [facility] , [address] [place] [address]   ENC: [id number]                             Fax: [phone]   (Exam #: [id number])      The LMP of this 33year old,  G2, P1-0-0-1 patient was 2017, giving   her an KAYLENE of 2018 and a current gestational age of 25 weeks 1 day   by dates  A sonographic examination was performed on SEP 14 2017 using   real time equipment  The ultrasound examination was performed using   abdominal & vaginal techniques  The patient has a BMI of 31 4  Her initial   blood pressure today was 158/68, and it was later recorded at 132/84  Earliest US on record: 17  8w1d  18   KAYLENE      Cardiac motion was observed at 151 bpm       INDICATIONS      fetal anatomical survey   previous    prior preeclampsia   rh negative   obesity      Exam Types      Transvaginal   LEVEL II      RESULTS      Fetus # 1 of 1   Vertex presentation   Fetal growth appeared normal   Placenta Location = Anterior   No placenta previa   Placenta Grade = II      MEASUREMENTS (* Included In Average GA)      AC              16 8 cm        21 weeks 4 days* (80%)   BPD              4 9 cm        20 weeks 6 days* (73%)   HC              18 4 cm        20 weeks 5 days* (66%)   Femur            3 4 cm        20 weeks 6 days* (57%)      Nuchal Fold      4 0 mm      Humerus          3 4 cm        21 weeks 3 days  (82%)      Cerebellum       2 0 cm        20 weeks 0 days   Biorbit          3 2 cm        20 weeks 4 days   CisternaMagna    7 0 mm      HC/AC           1 09   FL/AC           0 20   FL/BPD          0 69   EFW (Ac/Fl/Hc)   406 grams - 0 lbs 14 oz      THE AVERAGE GESTATIONAL AGE is 21 weeks 0 days +/- 10 days        AMNIOTIC FLUID         Largest Vertical Pocket = 7 2 cm   Amniotic Fluid: Normal      CERVICAL EVALUATION      SUPINE      Cervical Length: 3 20 cm      OTHER TEST RESULTS           Funneling?: No             Dynamic Changes?: No        Resp  To TFP?: No      ANATOMY      Head                                    Normal   Face/Neck                               Normal   Th  Cav  Normal   Heart                                   Normal   Abd  Cav  Normal   Stomach                                 Normal   Right Kidney                            Normal   Left Kidney                             Normal   Bladder                                 Normal   Abd  Wall                               Normal   Spine                                   Normal   Extrems                                 See Details   Genitalia                               Normal   Placenta                                Normal   Umbl  Cord                              Normal   Uterus                                  Normal   PCI                                     Normal      ANATOMY DETAILS      Visualized Appearing Sonographically Normal:   HEAD: (Calvarium, BPD Level, Cavum, Lateral Ventricles, Choroid Plexus,   Cerebellum, Cisterna Magna);    FACE/NECK: (Neck, Nuchal Fold, Profile,   Orbits, Nose/Lips, Palate, Face);    TH  CAV  : (Lungs, Diaphragm); HEART: (Four Chamber View, Proximal Left Outflow, Proximal Right Outflow,   3VV, 3 Vessel Trachea, Short Axis of Greater Vessels, Ductal Arch, Aortic   Arch, Interventricular Septum, Interatrial Septum, IVC, SVC, Cardiac Axis,   Cardiac Position);    ABD  CAV : (Liver);    STOMACH, RIGHT KIDNEY, LEFT   KIDNEY, BLADDER, ABD  WALL, SPINE: (Cervical Spine, Thoracic Spine, Lumbar   Spine, Sacrum);    EXTREMS: (Lt Humerus, Rt Humerus, Lt Forearm, Rt   Forearm, Lt Hand, Lt Femur, Rt Femur, Lt Low Leg, Rt Low Leg, Lt Foot, Rt   Foot);    GENITALIA, PLACENTA, UMBL   CORD, UTERUS, PCI      Suboptimally Visualized: EXTREMS: (Rt Hand)      ADNEXA      The left ovary appeared normal and measured 2 7 x 2 1 x 1 2 cm with a   volume of 3 6 cc  The right ovary appeared normal and measured 3 4 x 2 5 x   1 4 cm with a volume of 6 2 cc  IMPRESSION      Andres IUP   21 weeks and 0 days by this ultrasound  (KAYLENE=2018)   Vertex presentation   Fetal growth appeared normal   Regular fetal heart rate of 151 bpm   Anterior placenta   No placenta previa      CONSULT COMMENT      Thank you very much for requesting the consultations very nice patient for   the indication of a PTT of fetal anatomic evaluation secondary to maternal   obesity and history of preeclampsia  This is her second pregnancy  Her   first pregnancy resulted in a full-term  section for failure to   progress at 7 cm  She was diagnosed with preeclampsia and was placed on   magnesium for that pregnancy  The patient denies a history of chronic   hypertension however in January she had a 162/84 and in August she had a   142/78  Today's initial blood pressure was elevated and a repeat was   normal   She states she was "nervous"  I suspect she may have chronic   hypertension  She has a history of human papilloma virus  Her surgical   history includes her  section, and appendectomy, and a LEEP   procedure performed in   She denies the current use of tobacco,   alcohol, or drugs  She currently takes prenatal vitamins and has no   significant drug allergies  Her family medical history is unremarkable  A review of systems is otherwise negative  On exam, the patient appears   well, in no acute distress, and her abdomen is nontender  The patient has declined genetic screening, and we discussed that a normal   ultrasound does not exclude all congenital birth defects or karyotypic   abnormalities  The fetal anatomic survey is complete except for suboptimal visualization   of the right hand    There is no sonographic evidence of fetal   abnormalities at this time  Good fetal movement and tone are seen  The   amniotic fluid volume appears normal   The placenta is anterior and it   appears sonographically normal   A transvaginal ultrasound was performed   to assess the cervix, which was not seen well transabdominally  The   cervical length was 3 2 centimeters, which is normal for the current   gestational age  There was no significant funneling or dynamic changes   appreciated  The patient was informed of today's findings and all of her   questions were answered  The limitations of ultrasound were reviewed with   the patient, which she accepts  We discussed the implications of hypertension pregnancy in that there is   increased risk for adverse pregnancy outcome, particularly related to   hypertensive disorders of pregnancy such as preeclampsia as well as   ischemic placental disease which can lead to fetal growth restriction  Unfortunately, antihypertensive therapy does not mitigate this risk of the   development of preeclampsia or adverse pregnancy outcome and is reserved   for maternal benefit only  I recommend antihypertensive therapy when   there is persistent hypertension greater than 160/105 for maternal benefit   in the setting of chronic hypertension  We discussed follow-up in detail and I recommend Segundo Shearer return at around   32 weeks for a fetal growth evaluation for the indication of probable   chronic hypertension  Please note, in addition to the time spent discussing the results of the   ultrasound, I spent approximately 15 minutes of face-to-face time with the   patient, greater than 50% of which was spent in counseling and the   coordination of care for this patient  Thank you very much for allowing us to participate in the care of this   very nice patient  Should you have any questions, please do not hesitate   to contact our office  MCKENZIE Bragg M D  Electronically signed 09/14/17 17:19

## 2018-01-14 VITALS
WEIGHT: 173 LBS | HEIGHT: 65 IN | BODY MASS INDEX: 28.82 KG/M2 | DIASTOLIC BLOOD PRESSURE: 84 MMHG | SYSTOLIC BLOOD PRESSURE: 132 MMHG

## 2018-01-14 VITALS
HEART RATE: 92 BPM | RESPIRATION RATE: 18 BRPM | SYSTOLIC BLOOD PRESSURE: 104 MMHG | DIASTOLIC BLOOD PRESSURE: 64 MMHG | HEIGHT: 65 IN | WEIGHT: 173.6 LBS | TEMPERATURE: 99.4 F | BODY MASS INDEX: 28.92 KG/M2

## 2018-01-14 VITALS
DIASTOLIC BLOOD PRESSURE: 84 MMHG | WEIGHT: 189 LBS | HEIGHT: 65 IN | BODY MASS INDEX: 31.49 KG/M2 | SYSTOLIC BLOOD PRESSURE: 132 MMHG

## 2018-01-19 ENCOUNTER — GENERIC CONVERSION - ENCOUNTER (OUTPATIENT)
Dept: OTHER | Facility: OTHER | Age: 33
End: 2018-01-19

## 2018-01-22 VITALS — BODY MASS INDEX: 30.62 KG/M2 | SYSTOLIC BLOOD PRESSURE: 142 MMHG | DIASTOLIC BLOOD PRESSURE: 78 MMHG | WEIGHT: 184 LBS

## 2018-01-22 VITALS
WEIGHT: 170 LBS | HEIGHT: 65 IN | SYSTOLIC BLOOD PRESSURE: 120 MMHG | DIASTOLIC BLOOD PRESSURE: 66 MMHG | BODY MASS INDEX: 28.32 KG/M2

## 2018-01-22 VITALS
HEART RATE: 99 BPM | DIASTOLIC BLOOD PRESSURE: 70 MMHG | OXYGEN SATURATION: 99 % | RESPIRATION RATE: 16 BRPM | TEMPERATURE: 98.1 F | HEIGHT: 65 IN | BODY MASS INDEX: 32.99 KG/M2 | WEIGHT: 198.03 LBS | SYSTOLIC BLOOD PRESSURE: 120 MMHG

## 2018-01-22 VITALS
DIASTOLIC BLOOD PRESSURE: 82 MMHG | HEIGHT: 65 IN | WEIGHT: 199 LBS | SYSTOLIC BLOOD PRESSURE: 138 MMHG | BODY MASS INDEX: 33.15 KG/M2

## 2018-01-22 VITALS — SYSTOLIC BLOOD PRESSURE: 120 MMHG | DIASTOLIC BLOOD PRESSURE: 82 MMHG | BODY MASS INDEX: 30.29 KG/M2 | WEIGHT: 182 LBS

## 2018-01-22 VITALS
DIASTOLIC BLOOD PRESSURE: 70 MMHG | HEIGHT: 65 IN | BODY MASS INDEX: 31.82 KG/M2 | WEIGHT: 191 LBS | SYSTOLIC BLOOD PRESSURE: 142 MMHG

## 2018-01-22 VITALS — BODY MASS INDEX: 33.28 KG/M2 | SYSTOLIC BLOOD PRESSURE: 124 MMHG | DIASTOLIC BLOOD PRESSURE: 84 MMHG | WEIGHT: 200 LBS

## 2018-01-22 VITALS — WEIGHT: 197 LBS | DIASTOLIC BLOOD PRESSURE: 82 MMHG | SYSTOLIC BLOOD PRESSURE: 134 MMHG | BODY MASS INDEX: 32.78 KG/M2

## 2018-01-23 VITALS — BODY MASS INDEX: 34.61 KG/M2 | DIASTOLIC BLOOD PRESSURE: 90 MMHG | WEIGHT: 208 LBS | SYSTOLIC BLOOD PRESSURE: 152 MMHG

## 2018-01-23 VITALS
BODY MASS INDEX: 33.66 KG/M2 | WEIGHT: 202 LBS | HEIGHT: 65 IN | DIASTOLIC BLOOD PRESSURE: 80 MMHG | SYSTOLIC BLOOD PRESSURE: 128 MMHG

## 2018-01-23 NOTE — PROGRESS NOTES
DEC 7 2017         RE: Stephon Loredo                            To: [facility]   MR#: [id number]                                   [address] [facility]   : [address], [address] [facility]   ENC: [id number]                             Fax: [phone]   (Exam #: [id number])      The LMP of this 27year old,  G2, P1-0-0-1 patient was 2017, giving   her an KAYLENE of 2018 and a current gestational age of 26 weeks 1 day   by dates  A sonographic examination was performed on DEC 7 2017 using real   time equipment  The ultrasound examination was performed using abdominal   technique  The patient has a BMI of 33 6  Her blood pressure today was   132/75  Earliest US on record: 17  8w1d  18   KAYLENE      Cardiac motion was observed at 157 bpm       INDICATIONS      missed anatomy follow up   previous    prior preeclampsia   rh negative   obesity      Exam Types      Level I      RESULTS      Fetus # 1 of 1   Vertex presentation   Fetal growth appeared normal   Placenta Location = Anterior   No placenta previa   Placenta Grade = II      MEASUREMENTS (* Included In Average GA)      AC              30 1 cm        34 weeks 1 day * (84%)   BPD              8 6 cm        34 weeks 4 days* (85%)   HC              31 0 cm        34 weeks 1 day * (69%)   Femur            6 8 cm        34 weeks 3 days* (91%)      Cerebellum       4 3 cm        34 weeks 4 days      HC/AC           1 03   FL/AC           0 22   FL/BPD          0 79   EFW (Ac/Fl/Hc)  2402 grams - 5 lbs 5 oz                 (79%)      THE AVERAGE GESTATIONAL AGE is 34 weeks 2 days +/- 21 days  AMNIOTIC FLUID      Q1: 3 8      Q2: 3 6      Q3: 3 1      Q4: 2 8   CONSUELO Total = 13 3 cm   Amniotic Fluid: Normal      ANATOMY COMMENTS      The prior fetal anatomic survey was limited the area of the right hand      These anatomic views were seen today as sonographically normal within the   inherent limitations of fetal ultrasound and the anatomic survey is now   complete  Follow up intracranial anatomy (calvarium, cavum, lateral   ventricles, and cerebellum), cardiac anatomy (4chamber, LVOT, RVOT, and   3VV), diaphragm, stomach, kidneys, and bladder appear normal       IMPRESSION      Andres IUP   34 weeks and 2 days by this ultrasound  (KAYLENE=2018)   Vertex presentation   Fetal growth appeared normal   Regular fetal heart rate of 157 bpm   Anterior placenta   No placenta previa      GENERAL COMMENT        On exam today Lamine Johnson appears well, in no acute distress, and denies any   complaints  Her abdomen is non-tender  The fetal anatomic survey is now complete  There is no sonographic   evidence of fetal abnormalities at this time  The remainder of the survey   was completed previously  There has been appropriate interval fetal   growth  Good fetal movement and tone are seen  The amniotic fluid volume   appears normal    The patient was informed of todays findings and all of   her questions were answered  The limitations of ultrasound were reviewed   with the patient, which she accepts  Precautions and fetal kick counts   were reviewed  The patient was asking about the safety of flying over the next month and   I discussed with her that there is no contraindication to flying unless   there are complications during the pregnancy puts her at high risk of    birth  Commercial flights in a pressurized cabin are safe during   pregnancy and it is recommended patient's get up frequently to walk around   when possible to reduce the risk of a thromboembolic event  I also   recommend considering wearing compressive stockings and to stay well   hydrated               We discussed follow-up in detail, and I recommend she return as clinically   indicated      Please note, in addition to the time spent discussing the results of the   ultrasound, approximately 10 minutes were spent in consultation with the   patient and coordination of care, with greater than 50% of the time spent   in direct face-to-face counseling  Thank you very much for allowing us to participate in the care of this   very nice patient  Should you have any questions, please do not hesitate   to contact our office  Portions of the record may have been created with voice recognition   software  Occasional wrong word or "sound a like" substitutions may have   occurred due to the inherent limitations of voice recognition software  Read the chart carefully and recognize, using context, where substitutions   have occurred  MCKENZIE Dejesus M D     Electronically signed 12/07/17 18:30

## 2018-01-24 VITALS
SYSTOLIC BLOOD PRESSURE: 132 MMHG | WEIGHT: 201.6 LBS | BODY MASS INDEX: 33.59 KG/M2 | HEIGHT: 65 IN | DIASTOLIC BLOOD PRESSURE: 75 MMHG

## 2018-01-24 VITALS
DIASTOLIC BLOOD PRESSURE: 64 MMHG | BODY MASS INDEX: 35.16 KG/M2 | WEIGHT: 211 LBS | HEIGHT: 65 IN | SYSTOLIC BLOOD PRESSURE: 122 MMHG

## 2018-01-24 VITALS
DIASTOLIC BLOOD PRESSURE: 82 MMHG | WEIGHT: 212 LBS | SYSTOLIC BLOOD PRESSURE: 138 MMHG | HEIGHT: 65 IN | BODY MASS INDEX: 35.32 KG/M2

## 2018-01-24 VITALS — DIASTOLIC BLOOD PRESSURE: 92 MMHG | SYSTOLIC BLOOD PRESSURE: 144 MMHG | BODY MASS INDEX: 35.28 KG/M2 | WEIGHT: 212 LBS

## 2018-01-24 VITALS
HEIGHT: 65 IN | DIASTOLIC BLOOD PRESSURE: 78 MMHG | WEIGHT: 213 LBS | BODY MASS INDEX: 35.49 KG/M2 | SYSTOLIC BLOOD PRESSURE: 128 MMHG

## 2018-01-24 VITALS — WEIGHT: 205 LBS | DIASTOLIC BLOOD PRESSURE: 100 MMHG | BODY MASS INDEX: 34.11 KG/M2 | SYSTOLIC BLOOD PRESSURE: 172 MMHG

## 2018-01-26 ENCOUNTER — ANESTHESIA EVENT (INPATIENT)
Dept: LABOR AND DELIVERY | Facility: HOSPITAL | Age: 33
End: 2018-01-26
Payer: COMMERCIAL

## 2018-01-26 ENCOUNTER — HOSPITAL ENCOUNTER (INPATIENT)
Facility: HOSPITAL | Age: 33
LOS: 3 days | Discharge: HOME | End: 2018-01-29
Attending: OBSTETRICS & GYNECOLOGY | Admitting: OBSTETRICS & GYNECOLOGY
Payer: COMMERCIAL

## 2018-01-26 ENCOUNTER — ROUTINE PRENATAL (OUTPATIENT)
Dept: OBGYN CLINIC | Facility: CLINIC | Age: 33
End: 2018-01-26

## 2018-01-26 ENCOUNTER — ANESTHESIA (INPATIENT)
Dept: LABOR AND DELIVERY | Facility: HOSPITAL | Age: 33
End: 2018-01-26
Payer: COMMERCIAL

## 2018-01-26 DIAGNOSIS — O34.219 PREVIOUS CESAREAN DELIVERY AFFECTING PREGNANCY: ICD-10-CM

## 2018-01-26 DIAGNOSIS — Z3A.35 35 WEEKS GESTATION OF PREGNANCY: Primary | ICD-10-CM

## 2018-01-26 PROBLEM — O36.0931: Status: ACTIVE | Noted: 2017-11-10

## 2018-01-26 PROBLEM — Z3A.39 39 WEEKS GESTATION OF PREGNANCY: Status: ACTIVE | Noted: 2017-12-28

## 2018-01-26 PROBLEM — Z98.891 HISTORY OF PRIMARY CESAREAN SECTION: Status: ACTIVE | Noted: 2018-01-26

## 2018-01-26 LAB
ABO GROUP BLD: NORMAL
ALBUMIN SERPL BCP-MCNC: 2.9 G/DL (ref 3.5–5)
ALP SERPL-CCNC: 219 U/L (ref 46–116)
ALT SERPL W P-5'-P-CCNC: 43 U/L (ref 12–78)
ANION GAP SERPL CALCULATED.3IONS-SCNC: 9 MMOL/L (ref 4–13)
AST SERPL W P-5'-P-CCNC: 27 U/L (ref 5–45)
BACTERIA UR QL AUTO: ABNORMAL /HPF
BASE EXCESS BLDCOA CALC-SCNC: -2.3 MMOL/L (ref 3–11)
BASE EXCESS BLDCOV CALC-SCNC: -3.2 MMOL/L (ref 1–9)
BASOPHILS # BLD AUTO: 0.03 THOUSANDS/ΜL (ref 0–0.1)
BASOPHILS NFR BLD AUTO: 0 % (ref 0–1)
BILIRUB SERPL-MCNC: 0.61 MG/DL (ref 0.2–1)
BILIRUB UR QL STRIP: NEGATIVE
BLD GP AB SCN SERPL QL: NEGATIVE
BUN SERPL-MCNC: 8 MG/DL (ref 5–25)
CALCIUM SERPL-MCNC: 8.9 MG/DL (ref 8.3–10.1)
CHLORIDE SERPL-SCNC: 106 MMOL/L (ref 100–108)
CLARITY UR: ABNORMAL
CO2 SERPL-SCNC: 22 MMOL/L (ref 21–32)
COLOR UR: YELLOW
CREAT SERPL-MCNC: 0.58 MG/DL (ref 0.6–1.3)
CREAT UR-MCNC: 52.1 MG/DL
EOSINOPHIL # BLD AUTO: 0.03 THOUSAND/ΜL (ref 0–0.61)
EOSINOPHIL NFR BLD AUTO: 0 % (ref 0–6)
ERYTHROCYTE [DISTWIDTH] IN BLOOD BY AUTOMATED COUNT: 14.6 % (ref 11.6–15.1)
GFR SERPL CREATININE-BSD FRML MDRD: 121 ML/MIN/1.73SQ M
GLUCOSE SERPL-MCNC: 85 MG/DL (ref 65–140)
GLUCOSE UR STRIP-MCNC: NEGATIVE MG/DL
HCO3 BLDCOA-SCNC: 22.5 MMOL/L (ref 17.3–27.3)
HCO3 BLDCOV-SCNC: 21.9 MMOL/L (ref 12.2–28.6)
HCT VFR BLD AUTO: 34.5 % (ref 34.8–46.1)
HGB BLD-MCNC: 11.8 G/DL (ref 11.5–15.4)
HGB UR QL STRIP.AUTO: NEGATIVE
HYALINE CASTS #/AREA URNS LPF: ABNORMAL /LPF
KETONES UR STRIP-MCNC: ABNORMAL MG/DL
LEUKOCYTE ESTERASE UR QL STRIP: ABNORMAL
LYMPHOCYTES # BLD AUTO: 1.75 THOUSANDS/ΜL (ref 0.6–4.47)
LYMPHOCYTES NFR BLD AUTO: 15 % (ref 14–44)
MCH RBC QN AUTO: 29.1 PG (ref 26.8–34.3)
MCHC RBC AUTO-ENTMCNC: 34.2 G/DL (ref 31.4–37.4)
MCV RBC AUTO: 85 FL (ref 82–98)
MONOCYTES # BLD AUTO: 0.93 THOUSAND/ΜL (ref 0.17–1.22)
MONOCYTES NFR BLD AUTO: 8 % (ref 4–12)
NEUTROPHILS # BLD AUTO: 8.58 THOUSANDS/ΜL (ref 1.85–7.62)
NEUTS SEG NFR BLD AUTO: 77 % (ref 43–75)
NITRITE UR QL STRIP: NEGATIVE
NON-SQ EPI CELLS URNS QL MICRO: ABNORMAL /HPF
NRBC BLD AUTO-RTO: 0 /100 WBCS
O2 CT VFR BLDCOA CALC: 16.3 ML/DL
OXYHGB MFR BLDCOA: 66.4 %
OXYHGB MFR BLDCOV: 62.9 %
PCO2 BLDCOA: 39.1 MM[HG] (ref 30–60)
PCO2 BLDCOV: 39.5 MM HG (ref 27–43)
PH BLDCOA: 7.38 [PH] (ref 7.23–7.43)
PH BLDCOV: 7.36 [PH] (ref 7.19–7.49)
PH UR STRIP.AUTO: 7.5 [PH] (ref 4.5–8)
PLATELET # BLD AUTO: 244 THOUSANDS/UL (ref 149–390)
PMV BLD AUTO: 11.8 FL (ref 8.9–12.7)
PO2 BLDCOA: 26.6 MM HG (ref 5–25)
PO2 BLDCOV: 26.2 MM HG (ref 15–45)
POTASSIUM SERPL-SCNC: 3.8 MMOL/L (ref 3.5–5.3)
PROT SERPL-MCNC: 7.6 G/DL (ref 6.4–8.2)
PROT UR STRIP-MCNC: NEGATIVE MG/DL
PROT UR-MCNC: 6 MG/DL
PROT/CREAT UR: 0.12 MG/G{CREAT} (ref 0–0.1)
RBC # BLD AUTO: 4.06 MILLION/UL (ref 3.81–5.12)
RBC #/AREA URNS AUTO: ABNORMAL /HPF
RH BLD: NEGATIVE
SAO2 % BLDCOV: 15.2 ML/DL
SODIUM SERPL-SCNC: 137 MMOL/L (ref 136–145)
SP GR UR STRIP.AUTO: 1.01 (ref 1–1.03)
SPECIMEN EXPIRATION DATE: NORMAL
UROBILINOGEN UR QL STRIP.AUTO: 0.2 E.U./DL
WBC # BLD AUTO: 11.44 THOUSAND/UL (ref 4.31–10.16)
WBC #/AREA URNS AUTO: ABNORMAL /HPF

## 2018-01-26 PROCEDURE — 81001 URINALYSIS AUTO W/SCOPE: CPT | Performed by: OBSTETRICS & GYNECOLOGY

## 2018-01-26 PROCEDURE — 59510 CESAREAN DELIVERY: CPT | Performed by: OBSTETRICS & GYNECOLOGY

## 2018-01-26 PROCEDURE — 84156 ASSAY OF PROTEIN URINE: CPT | Performed by: OBSTETRICS & GYNECOLOGY

## 2018-01-26 PROCEDURE — 86850 RBC ANTIBODY SCREEN: CPT | Performed by: OBSTETRICS & GYNECOLOGY

## 2018-01-26 PROCEDURE — 86592 SYPHILIS TEST NON-TREP QUAL: CPT | Performed by: OBSTETRICS & GYNECOLOGY

## 2018-01-26 PROCEDURE — 82805 BLOOD GASES W/O2 SATURATION: CPT | Performed by: OBSTETRICS & GYNECOLOGY

## 2018-01-26 PROCEDURE — 86900 BLOOD TYPING SEROLOGIC ABO: CPT | Performed by: OBSTETRICS & GYNECOLOGY

## 2018-01-26 PROCEDURE — 85025 COMPLETE CBC W/AUTO DIFF WBC: CPT | Performed by: OBSTETRICS & GYNECOLOGY

## 2018-01-26 PROCEDURE — 82570 ASSAY OF URINE CREATININE: CPT | Performed by: OBSTETRICS & GYNECOLOGY

## 2018-01-26 PROCEDURE — 80053 COMPREHEN METABOLIC PANEL: CPT | Performed by: OBSTETRICS & GYNECOLOGY

## 2018-01-26 PROCEDURE — 86901 BLOOD TYPING SEROLOGIC RH(D): CPT | Performed by: OBSTETRICS & GYNECOLOGY

## 2018-01-26 RX ORDER — DIPHENHYDRAMINE HYDROCHLORIDE 50 MG/ML
INJECTION INTRAMUSCULAR; INTRAVENOUS AS NEEDED
Status: DISCONTINUED | OUTPATIENT
Start: 2018-01-26 | End: 2018-01-26 | Stop reason: SURG

## 2018-01-26 RX ORDER — SODIUM CHLORIDE, SODIUM LACTATE, POTASSIUM CHLORIDE, CALCIUM CHLORIDE 600; 310; 30; 20 MG/100ML; MG/100ML; MG/100ML; MG/100ML
125 INJECTION, SOLUTION INTRAVENOUS CONTINUOUS
Status: DISCONTINUED | OUTPATIENT
Start: 2018-01-26 | End: 2018-01-29 | Stop reason: HOSPADM

## 2018-01-26 RX ORDER — OXYTOCIN/RINGER'S LACTATE 30/500 ML
1-30 PLASTIC BAG, INJECTION (ML) INTRAVENOUS
Status: DISCONTINUED | OUTPATIENT
Start: 2018-01-26 | End: 2018-01-29 | Stop reason: HOSPADM

## 2018-01-26 RX ORDER — CALCIUM CARBONATE 200(500)MG
1000 TABLET,CHEWABLE ORAL DAILY PRN
Status: DISCONTINUED | OUTPATIENT
Start: 2018-01-26 | End: 2018-01-29 | Stop reason: HOSPADM

## 2018-01-26 RX ORDER — FENTANYL CITRATE 50 UG/ML
INJECTION, SOLUTION INTRAMUSCULAR; INTRAVENOUS AS NEEDED
Status: DISCONTINUED | OUTPATIENT
Start: 2018-01-26 | End: 2018-01-26 | Stop reason: SURG

## 2018-01-26 RX ORDER — SODIUM CHLORIDE, SODIUM LACTATE, POTASSIUM CHLORIDE, CALCIUM CHLORIDE 600; 310; 30; 20 MG/100ML; MG/100ML; MG/100ML; MG/100ML
INJECTION, SOLUTION INTRAVENOUS CONTINUOUS PRN
Status: DISCONTINUED | OUTPATIENT
Start: 2018-01-26 | End: 2018-01-26 | Stop reason: SURG

## 2018-01-26 RX ORDER — KETOROLAC TROMETHAMINE 30 MG/ML
INJECTION, SOLUTION INTRAMUSCULAR; INTRAVENOUS AS NEEDED
Status: DISCONTINUED | OUTPATIENT
Start: 2018-01-26 | End: 2018-01-26 | Stop reason: SURG

## 2018-01-26 RX ORDER — OXYCODONE HYDROCHLORIDE AND ACETAMINOPHEN 5; 325 MG/1; MG/1
2 TABLET ORAL EVERY 4 HOURS PRN
Status: DISCONTINUED | OUTPATIENT
Start: 2018-01-27 | End: 2018-01-29 | Stop reason: HOSPADM

## 2018-01-26 RX ORDER — DIPHENHYDRAMINE HCL 25 MG
25 TABLET ORAL EVERY 6 HOURS PRN
Status: DISCONTINUED | OUTPATIENT
Start: 2018-01-26 | End: 2018-01-29 | Stop reason: HOSPADM

## 2018-01-26 RX ORDER — ONDANSETRON 2 MG/ML
4 INJECTION INTRAMUSCULAR; INTRAVENOUS EVERY 8 HOURS PRN
Status: DISCONTINUED | OUTPATIENT
Start: 2018-01-26 | End: 2018-01-29 | Stop reason: HOSPADM

## 2018-01-26 RX ORDER — METOCLOPRAMIDE HYDROCHLORIDE 5 MG/ML
5 INJECTION INTRAMUSCULAR; INTRAVENOUS EVERY 6 HOURS PRN
Status: ACTIVE | OUTPATIENT
Start: 2018-01-26 | End: 2018-01-27

## 2018-01-26 RX ORDER — OXYCODONE HYDROCHLORIDE AND ACETAMINOPHEN 5; 325 MG/1; MG/1
1 TABLET ORAL EVERY 4 HOURS PRN
Status: DISCONTINUED | OUTPATIENT
Start: 2018-01-27 | End: 2018-01-29 | Stop reason: HOSPADM

## 2018-01-26 RX ORDER — IBUPROFEN 600 MG/1
600 TABLET ORAL EVERY 6 HOURS PRN
Status: DISCONTINUED | OUTPATIENT
Start: 2018-01-26 | End: 2018-01-29 | Stop reason: HOSPADM

## 2018-01-26 RX ORDER — METOCLOPRAMIDE HYDROCHLORIDE 5 MG/ML
INJECTION INTRAMUSCULAR; INTRAVENOUS AS NEEDED
Status: DISCONTINUED | OUTPATIENT
Start: 2018-01-26 | End: 2018-01-26 | Stop reason: SURG

## 2018-01-26 RX ORDER — DOCUSATE SODIUM 100 MG/1
100 CAPSULE, LIQUID FILLED ORAL 2 TIMES DAILY
Status: DISCONTINUED | OUTPATIENT
Start: 2018-01-26 | End: 2018-01-29 | Stop reason: HOSPADM

## 2018-01-26 RX ORDER — KETOROLAC TROMETHAMINE 30 MG/ML
30 INJECTION, SOLUTION INTRAMUSCULAR; INTRAVENOUS EVERY 6 HOURS SCHEDULED
Status: DISPENSED | OUTPATIENT
Start: 2018-01-26 | End: 2018-01-27

## 2018-01-26 RX ORDER — KETOROLAC TROMETHAMINE 30 MG/ML
30 INJECTION, SOLUTION INTRAMUSCULAR; INTRAVENOUS EVERY 6 HOURS
Status: DISCONTINUED | OUTPATIENT
Start: 2018-01-26 | End: 2018-01-26 | Stop reason: SDUPTHER

## 2018-01-26 RX ORDER — FENTANYL CITRATE/PF 50 MCG/ML
25 SYRINGE (ML) INJECTION
Status: DISCONTINUED | OUTPATIENT
Start: 2018-01-26 | End: 2018-01-28

## 2018-01-26 RX ORDER — DEXAMETHASONE SODIUM PHOSPHATE 4 MG/ML
8 INJECTION, SOLUTION INTRA-ARTICULAR; INTRALESIONAL; INTRAMUSCULAR; INTRAVENOUS; SOFT TISSUE ONCE AS NEEDED
Status: ACTIVE | OUTPATIENT
Start: 2018-01-26 | End: 2018-01-27

## 2018-01-26 RX ORDER — ACETAMINOPHEN 325 MG/1
650 TABLET ORAL EVERY 6 HOURS PRN
Status: DISCONTINUED | OUTPATIENT
Start: 2018-01-26 | End: 2018-01-29 | Stop reason: HOSPADM

## 2018-01-26 RX ORDER — MORPHINE SULFATE 0.5 MG/ML
INJECTION, SOLUTION EPIDURAL; INTRATHECAL; INTRAVENOUS AS NEEDED
Status: DISCONTINUED | OUTPATIENT
Start: 2018-01-26 | End: 2018-01-26 | Stop reason: SURG

## 2018-01-26 RX ORDER — BUPIVACAINE HYDROCHLORIDE 7.5 MG/ML
INJECTION, SOLUTION INTRASPINAL AS NEEDED
Status: DISCONTINUED | OUTPATIENT
Start: 2018-01-26 | End: 2018-01-26 | Stop reason: SURG

## 2018-01-26 RX ORDER — ONDANSETRON 2 MG/ML
INJECTION INTRAMUSCULAR; INTRAVENOUS AS NEEDED
Status: DISCONTINUED | OUTPATIENT
Start: 2018-01-26 | End: 2018-01-26 | Stop reason: SURG

## 2018-01-26 RX ORDER — ONDANSETRON 2 MG/ML
4 INJECTION INTRAMUSCULAR; INTRAVENOUS EVERY 4 HOURS PRN
Status: ACTIVE | OUTPATIENT
Start: 2018-01-26 | End: 2018-01-27

## 2018-01-26 RX ORDER — SODIUM CHLORIDE, SODIUM LACTATE, POTASSIUM CHLORIDE, CALCIUM CHLORIDE 600; 310; 30; 20 MG/100ML; MG/100ML; MG/100ML; MG/100ML
125 INJECTION, SOLUTION INTRAVENOUS CONTINUOUS
Status: DISCONTINUED | OUTPATIENT
Start: 2018-01-26 | End: 2018-01-26

## 2018-01-26 RX ORDER — OXYTOCIN/RINGER'S LACTATE 30/500 ML
1-30 PLASTIC BAG, INJECTION (ML) INTRAVENOUS
Status: DISCONTINUED | OUTPATIENT
Start: 2018-01-26 | End: 2018-01-26 | Stop reason: SDUPTHER

## 2018-01-26 RX ORDER — DIPHENHYDRAMINE HYDROCHLORIDE 50 MG/ML
25 INJECTION INTRAMUSCULAR; INTRAVENOUS EVERY 6 HOURS PRN
Status: ACTIVE | OUTPATIENT
Start: 2018-01-26 | End: 2018-01-27

## 2018-01-26 RX ORDER — OXYCODONE HYDROCHLORIDE AND ACETAMINOPHEN 5; 325 MG/1; MG/1
1 TABLET ORAL EVERY 6 HOURS PRN
Status: DISCONTINUED | OUTPATIENT
Start: 2018-01-26 | End: 2018-01-29 | Stop reason: HOSPADM

## 2018-01-26 RX ADMIN — Medication 62.5 MILLI-UNITS/MIN: at 21:54

## 2018-01-26 RX ADMIN — BUPIVACAINE HYDROCHLORIDE IN DEXTROSE 1.8 ML: 7.5 INJECTION, SOLUTION SUBARACHNOID at 10:20

## 2018-01-26 RX ADMIN — OXYTOCIN 20 UNITS: 10 INJECTION, SOLUTION INTRAMUSCULAR; INTRAVENOUS at 10:48

## 2018-01-26 RX ADMIN — MORPHINE SULFATE 3 MG: 0.5 INJECTION, SOLUTION EPIDURAL; INTRATHECAL; INTRAVENOUS at 10:53

## 2018-01-26 RX ADMIN — Medication 62.5 MILLI-UNITS/MIN: at 13:27

## 2018-01-26 RX ADMIN — FENTANYL CITRATE 25 MCG: 50 INJECTION INTRAMUSCULAR; INTRAVENOUS at 13:37

## 2018-01-26 RX ADMIN — HYDROMORPHONE HYDROCHLORIDE 1 MG: 1 INJECTION, SOLUTION INTRAMUSCULAR; INTRAVENOUS; SUBCUTANEOUS at 15:57

## 2018-01-26 RX ADMIN — OXYCODONE HYDROCHLORIDE AND ACETAMINOPHEN 1 TABLET: 5; 325 TABLET ORAL at 22:15

## 2018-01-26 RX ADMIN — METOCLOPRAMIDE 10 MG: 5 INJECTION, SOLUTION INTRAMUSCULAR; INTRAVENOUS at 11:16

## 2018-01-26 RX ADMIN — DIPHENHYDRAMINE HYDROCHLORIDE 12.5 MG: 50 INJECTION, SOLUTION INTRAMUSCULAR; INTRAVENOUS at 11:16

## 2018-01-26 RX ADMIN — FENTANYL CITRATE 25 MCG: 50 INJECTION INTRAMUSCULAR; INTRAVENOUS at 13:23

## 2018-01-26 RX ADMIN — SODIUM CHLORIDE, SODIUM LACTATE, POTASSIUM CHLORIDE, AND CALCIUM CHLORIDE: .6; .31; .03; .02 INJECTION, SOLUTION INTRAVENOUS at 10:12

## 2018-01-26 RX ADMIN — ONDANSETRON 4 MG: 2 INJECTION INTRAMUSCULAR; INTRAVENOUS at 11:16

## 2018-01-26 RX ADMIN — KETOROLAC TROMETHAMINE 30 MG: 30 INJECTION, SOLUTION INTRAMUSCULAR at 18:59

## 2018-01-26 RX ADMIN — FENTANYL CITRATE 100 MCG: 50 INJECTION, SOLUTION INTRAMUSCULAR; INTRAVENOUS at 10:53

## 2018-01-26 RX ADMIN — CEFAZOLIN SODIUM 2000 MG: 2 SOLUTION INTRAVENOUS at 10:33

## 2018-01-26 RX ADMIN — DIPHENHYDRAMINE HCL 25 MG: 25 TABLET ORAL at 22:15

## 2018-01-26 RX ADMIN — SODIUM CHLORIDE, SODIUM LACTATE, POTASSIUM CHLORIDE, AND CALCIUM CHLORIDE 125 ML/HR: .6; .31; .03; .02 INJECTION, SOLUTION INTRAVENOUS at 09:05

## 2018-01-26 RX ADMIN — SODIUM CHLORIDE, SODIUM LACTATE, POTASSIUM CHLORIDE, AND CALCIUM CHLORIDE 125 ML/HR: .6; .31; .03; .02 INJECTION, SOLUTION INTRAVENOUS at 13:26

## 2018-01-26 RX ADMIN — SODIUM CHLORIDE, SODIUM LACTATE, POTASSIUM CHLORIDE, AND CALCIUM CHLORIDE 1000 ML: .6; .31; .03; .02 INJECTION, SOLUTION INTRAVENOUS at 08:14

## 2018-01-26 RX ADMIN — SODIUM CHLORIDE, SODIUM LACTATE, POTASSIUM CHLORIDE, AND CALCIUM CHLORIDE 125 ML/HR: .6; .31; .03; .02 INJECTION, SOLUTION INTRAVENOUS at 21:56

## 2018-01-26 RX ADMIN — FENTANYL CITRATE 25 MCG: 50 INJECTION INTRAMUSCULAR; INTRAVENOUS at 14:37

## 2018-01-26 RX ADMIN — FENTANYL CITRATE 25 MCG: 50 INJECTION INTRAMUSCULAR; INTRAVENOUS at 14:25

## 2018-01-26 RX ADMIN — KETOROLAC TROMETHAMINE 30 MG: 30 INJECTION, SOLUTION INTRAMUSCULAR at 11:16

## 2018-01-26 NOTE — PLAN OF CARE
Fetal and maternal status remain reassuring during the birth process Progressing      Emotionally satisfying birthing experience for mother/fetus Progressing

## 2018-01-26 NOTE — OP NOTE
OPERATIVE REPORT  PATIENT NAME: Neel Ortega    :  1985  MRN: [id number]  Pt Location: [facility] OR ROOM 02    SURGERY DATE: 2018    Surgeon(s) and Role:     * Robin Paniagua MD - Primary    Preop Diagnosis:  Previous  delivery affecting pregnancy [O34 219]    Post-Op Diagnosis Codes:     * Previous  delivery affecting pregnancy [O34 219]    Procedure(s) (LRB):   SECTION () REPEAT (N/A)    Specimen(s):  ID Type Source Tests Collected by Time Destination   A :  Cord Blood Cord BLOOD GAS, VENOUS, CORD, BLOOD GAS, ARTERIAL, CORD Robin Paniagua MD 2018 1048    B :  Tissue (Placenta on Hold) OB Only Placenta PLACENTA IN STORAGE Robin Paniagua MD 2018 1050        Drains:  Urethral Catheter Double-lumen; Latex 16 Fr  (Active)   Amt returned on insertion(mL) 50 mL 2018 11:08 AM   Site Assessment Clean;Skin intact 2018 11:45 AM   Collection Container Standard drainage bag 2018 11:45 AM   Securement Method Securing device (Describe) 2018 11:45 AM   Number of days: 0       Anesthesia Type:   Epdiural    Operative Indications:  Previous  delivery affecting pregnancy [O34 219]    Operative Findings:    1  Delivery of viable male on 18 at 1047, weight 9lbs 8oz;  Apgar scores of 9 at one minute and 9 at five minutes  2  Normal appearing placenta with centrally-inserted 3 vessel cord  3  Clear  amniotic fluid  4  Grossly normal uterus, tubes, and ovaries    Specimens:   1  Arterial and venous cord gases  2  Cord blood  3  Segment of umbilical cord  4  Placenta to storage     Estimated Blood Loss:  800 mL    Drains: Oro catheter           Complications:  None; patient tolerated the procedure well  Disposition: PACU            Condition: stable    Procedure Details     Decision was made to proceed with  section due to history of previous  section  Patient was made aware of these findings and the proposed plan   Risks, benefits, possible complications, alternate treatment options, and expected outcomes were discussed with the patient  The patient agreed with the proposed plan and gave informed consent  The patient was taken to the operating room where she was properly identified to the OR staff and attending physician  She received epidural anesthesia in the operating room by Dr Franne Schirmer preoperatively  Fetal heart tones were appreciated and found to be appropriate  A Oro catheter was aseptically inserted and SCDs were placed  The abdomen was prepped with Chloraprep and following appropriate drying time, the patient was draped in the usual sterile manner for a Pfannenstiel incision  The patient had received Ancef 2 g IV pre-operatively for prophylaxis  A Time Out was held and the above information confirmed  The patient was identified as Jess Busch and the procedure verified as  Delivery  A Pfannenstiel incision was made and carried down through the underlying subcutaneous tissue to the fascia using a scalpel  Rectus fascia was then incised in the midline and extended laterally using Freeman scissors  The superior edge of the fascia was grasped with Kocher clamps, tented upward, and the underlying muscle was bluntly dissected off  The inferior edge was grasped with Kocher clamps and cleared in similar fashion  All anatomic layers were well-demarcated  The rectus muscles were  and the peritoneum was identified and subsequently entered and extended longitudinally with blunt dissection  The vesicouterine peritoneum was identified and a bladder flap was created bluntly  The bladder blade was inserted  A low transverse uterine incision was made with the scalpel and extended laterally with blunt dissection  The amnion was entered sharply    Surgeons hand was inserted through the hysterotomy and the fetal head was palpated, elevated, and delivered through the uterine incision with the assistance of fundal pressure  Baby had spontaneous cry with good color and tone  The umbilical cord was clamped and cut  The infant was handed off to the  providers  Arterial and venous cord gases, cord blood, and a segment of umbilical cord were obtained for evaluation and promptly sent to the lab  The placenta delivered spontaneously with uterine fundal massage and was noted to have a centrally inserted 3 vessel cord  This was also sent to the lab for placental pathology  The uterus was exteriorized and a moist lap sponge was used to clear the cavity of clots and products of conception  The uterine incision was closed with a running locked suture of 0 monocryl  A second layer of the same suture was used to imbricate the first   Good hemostasis was confirmed upon uterine closure  Warmed normal saline solution was used to irrigate the posterior culdesac and the uterus was returned to the abdomen  The paracolic gutters were inspected and cleared of all clots and debris with irrigation and moist lap sponges  The fascia was closed with a running suture of 0 Vicryl  The skin was closed with staples  Sterile dressing was applied and an abdominal binder was then placed  At the conclusion of the procedure, all needle, sponge, and instrument counts were noted to be correct x2  The patient tolerated the procedure well and was transferred to her the recovery room in stable condition  Dr Raza Mukherjee was present and participated in all key portions of the case      Complications:   None    Patient Disposition:  PACU     SIGNATURE: DO Xena  DATE: 2018  TIME: 12:34 PM

## 2018-01-26 NOTE — ANESTHESIA POSTPROCEDURE EVALUATION
Post-Op Assessment Note      CV Status:  Stable    Mental Status:  Alert and awake    Hydration Status:  Euvolemic    PONV Controlled:  Controlled    Airway Patency:  Patent    Post Op Vitals Reviewed: Yes          Staff: Anesthesiologist, with CRNAs     Post-op block assessment: site cleaned        BP      Temp      Pulse     Resp      SpO2

## 2018-01-26 NOTE — ANESTHESIA PREPROCEDURE EVALUATION
Review of Systems/Medical History  Patient summary reviewed  Chart reviewed      Cardiovascular   Pulmonary       GI/Hepatic            Endo/Other     GYN       Hematology   Musculoskeletal       Neurology   Psychology           Physical Exam    Airway    Mallampati score: I  TM Distance: >3 FB  Neck ROM: full     Dental       Cardiovascular      Pulmonary      Other Findings        Anesthesia Plan  ASA Score- 2     Anesthesia Type- spinal with ASA Monitors  Additional Monitors:   Airway Plan:         Plan Factors-    Induction-     Postoperative Plan-     Informed Consent- Anesthetic plan and risks discussed with patient

## 2018-01-26 NOTE — ANESTHESIA PROCEDURE NOTES
CSE Block    Start time: 1/26/2018 10:12 AM  Staffing  Anesthesiologist: Rich Guerra  Performed: anesthesiologist   Preanesthetic Checklist  Completed: patient identified, site marked, surgical consent, pre-op evaluation, timeout performed, IV checked, risks and benefits discussed and monitors and equipment checked  CSE  Patient position: sitting  Prep: Betadine  Patient monitoring: cardiac monitor and frequent blood pressure checks  Approach: midline  Spinal Needle  Needle type: pencil-tip   Needle gauge: 27 G  Needle length: 10 cm  Epidural Needle  Injection technique: ERI saline  Needle type: Tuohy   Needle gauge: 20 G  Needle insertion depth: 7 cm  Location: lumbar (1-5)  Catheter  Catheter type: side hole  Catheter size: 20 G  Test dose: negative  AssessmentInjection Assessment:  negative aspiration for heme, no pain on injection, no paresthesia on injection and positive aspiration for clear CSF

## 2018-01-26 NOTE — H&P
History & Physical - OB/GYN   Ryan Amanda 27 y o  female MRN: [id number]  Unit/Bed#: LD PACU-03 Encounter: [id number]    27 y o   at 39w2d by LMP  She is a patient of Dr William Martines    Chief complaint:  Scheduled repeat low transverse  section  Declines TOLAC  HPI:  Contractions:  no  Fetal movement:  yes  Vaginal bleeding:   no  Leaking of fluid:  no      Pregnancy Complications:  Patient Active Problem List   Diagnosis    Cervical dysplasia    S/P LEEP    35 weeks gestation of pregnancy    High blood pressure affecting pregnancy in third trimester, antepartum       PMH:  Past Medical History:   Diagnosis Date    Abnormal Pap smear of cervix     Anti-E isoimmunization affecting pregnancy, antepartum     Anxiety     RESOLVED - PER PT     Breast disorder     lumps    HPV (human papilloma virus) infection     Milia of eyelid     Seasonal allergies     Varicella     childhood       PSH:  Past Surgical History:   Procedure Laterality Date    APPENDECTOMY  2005    CERVICAL BIOPSY N/A 2016    Procedure: LEEP ;  Surgeon: Chandan Rossi MD;  Location: [facility];  Service:     CERVICAL BIOPSY  W/ LOOP ELECTRODE EXCISION  2016     SECTION         Social Hx:  Denies x 3     OB Hx:  Obstetric History       T1      L1     SAB0   TAB0   Ectopic0   Multiple0   Live Births1       # Outcome Date GA Lbr Kendell/2nd Weight Sex Delivery Anes PTL Lv   2 Current            1 Term 13 40w0d  3884 g (8 lb 9 oz) F CS-LTranv EPI  KAYLYN      Complications: Pre-eclampsia          Meds:  No current facility-administered medications on file prior to encounter        Current Outpatient Prescriptions on File Prior to Encounter   Medication Sig Dispense Refill    ibuprofen (MOTRIN) 600 mg tablet Take 1 tablet by mouth every 6 (six) hours as needed for mild pain for up to 30 days 30 tablet 0    ondansetron (ZOFRAN) 4 mg tablet Take 1 tablet by mouth every 8 (eight) hours as needed for nausea or vomiting for up to 2 days 6 tablet 0    Prenatal Vit-Fe Fumarate-FA (PRENATAL VITAMIN PO) Take 1 tablet by mouth daily         Allergies:  No Known Allergies    Labs:  Blood type: A-  Antibody: negative  Group B strep: negative  HIV: negative  Hepatitis B: negative  RPR: NR  Rubella: Immune  Varicella Immune  1 hour Glucose: 135  3 hour Glucose: 93, 115, 117, 67    Physical Exam:  LMP 2017     Physical Exam   Constitutional: She is oriented to person, place, and time  She appears well-developed and well-nourished  Cardiovascular: Normal rate, regular rhythm and normal heart sounds  Pulmonary/Chest: Effort normal and breath sounds normal    Abdominal: Soft  Bowel sounds are normal    Neurological: She is alert and oriented to person, place, and time  Estimated Fetal Weight: 7 lbs  Presentation: vertex    SVE: deferred  FHT:  150 / Moderate 6 - 25 bpm / + accelerations, no decelerations  [place]: quiet    Membranes: intact    Assessment:   27 y o   at 39w2d for scheduled repeat  section    Plan:   1  Admit to L&D  2  CBC, RPR, type and screen  3  Antibiotic prophylaxis: Ancef 2 g  4  FEN: NPO, LR IVF  5   Anesthesia: spinal in OR    Discussed with Dr Kota Wood, DO

## 2018-01-26 NOTE — DISCHARGE SUMMARY
Discharge Summary - OB/GYN   Lilo Reyna 27 y o  female MRN: [id number]  Unit/Bed#: LD OR 2-01 Encounter: [id number]      Admission Date: 2018     Discharge Date: 18    Admitting Diagnosis:   1  Pregnancy at 39w2d  2  Gestational HTN  3  S/p LEEP, cervical dysplasia    Discharge Diagnosis:   Same, delivered via RLTCS    Procedures: repeat  section, low transverse incision    Attending: Austin Anderson MD    Hospital Course:     Lilo Reyna is a 27 y o  Lynn Baba at 39w2d wks who was initially admitted for scheduled repeat low transverse  section  She delivered a viable male  on 18 at 1047  Weight lbs 9lb 8 oz via repeat  section, low transverse incision  Apgars were 9 (1 min) and 9 (5 min)   was transferred to  nursery  Patient tolerated the procedure well and was transferred to recovery in stable condition  Her post-operative course was complicated by acute blood loss anemia  Preoperative hemaglobin was 11 8, postoperative was 8 5  Her postoperative pain was well controlled with oral analgesics  On day of discharge, she was ambulating and able to reasonably perform all ADLs  She was voiding and had appropriate bowel function  Pain was well controlled  She was discharged home on post-operative day #3 without complications  Patient was instructed to follow up with her OB as an outpatient and was given appropriate warnings to call provider if she develops signs of infection or uncontrolled pain  Complications: none apparent    Condition at discharge: good     Discharge instructions/Information to patient and family:   See after visit summary for information provided to patient and family  Provisions for Follow-Up Care:  See after visit summary for information related to follow-up care and any pertinent home health orders  Disposition: Home    Planned Readmission: No    Discharge Medications:   For a complete list of the patient's medications, please refer to her med rec      Mariama Haddad, DO

## 2018-01-26 NOTE — PROGRESS NOTES
Progress Note - OB/GYN  Post-Op Physician Note   Vercelio Apgar 27 y o  female MRN: [id number]  Unit/Bed#:  OR  Encounter: [id number]      SUBJECTIVE:  Pain: minimal  Tolerating Oral Intake: is tolerating PO liquids and solids  Voiding: ray inserted  Flatus: yes  Bowel Movement: no  Ambulating: No (ray still inserted)  Breastfeeding: Breastfeeding  Chest Pain: no  Shortness of Breath: no  Leg Pain/Discomfort: no  Lochia: minimal      OBJECTIVE:     Vitals:   Vitals:    18 1300 18 1315 18 1330 18 1345   BP: 144/76 131/64 140/72 147/76   BP Location: Left arm Right arm Right arm Right arm   Pulse: 76 84 81 82   Resp: 16 15 16 16   Temp:  98 1 °F (36 7 °C)     TempSrc:  Oral     SpO2: 99% 98% 98% 98%   Weight:       Height:           I/O       701 -  0700  07 0700  07 0700    I V  (mL/kg)   1400 (14 7)    IV Piggyback   1000    Total Intake(mL/kg)   2400 (25 2)    Urine (mL/kg/hr)   50    Blood   600    Total Output     650    Net     +1750                   Results from last 7 days  Lab Units 18  0811   WBC Thousand/uL 11 44*   HEMOGLOBIN g/dL 11 8   MCV fL 85   PLATELETS Thousands/uL 244       Results from last 7 days  Lab Units 18  0811   NEUTROS PCT % 77*   MONOS PCT % 8   EOS PCT % 0           Results from last 7 days  Lab Units 18  0843   SODIUM mmol/L 137   POTASSIUM mmol/L 3 8   CHLORIDE mmol/L 106   CO2 mmol/L 22   BUN mg/dL 8   CREATININE mg/dL 0 58*       Results from last 7 days  Lab Units 18  0843   AST U/L 27   ALT U/L 43   BILIRUBIN TOTAL mg/dL 0 61   ALK PHOS U/L 219*   ALBUMIN g/dL 2 9*   TOTAL PROTEIN g/dL 7 6       General: No Acute Distress  Cardiovascular: Regular Rate and Rhythm  Lungs: Clear to Auscultation Bilaterally, no wheezing, rhonchi or rales  Abdomen: Non-distended, no rebound or guarding  Fundus: Firm  Fundal Location: at the umbilicus  Incision: staples: clean, dry, and intact  Lower Extremities: Non-Tender  Other:    MEDS:   Current Facility-Administered Medications   Medication Dose Route Frequency    fentaNYL (SUBLIMAZE) injection 25 mcg  25 mcg Intravenous Q3 min PRN    ketorolac (TORADOL) injection 30 mg  30 mg Intravenous Q6H [facility]    lactated ringers infusion  125 mL/hr Intravenous Continuous    oxytocin (PITOCIN) 30 Units in lactated ringers 500 mL infusion  1-30 winter-units/min Intravenous Titrated     Invasive Devices     Peripheral Intravenous Line            Peripheral IV 18 Right Wrist less than 1 day          Drain            Urethral Catheter Double-lumen; Latex 16 Fr  less than 1 day                A/P:  Post-Op day # 0 status post Repeat low transverse  section  1) Routine Post-op Care: Continue   2) Diet: Advance as tolerated  3) Oro: D/c 12 hrs post-op then f/u 1st void  4) UOP: 300 cc charted plus 350 cc in Oro bag; color: concentrated  clear yellow   5) Labs: f/u CBC in am  6) DVT ppx: Cont   SCDs    7) Monitor vital signs:  Temp:  [97 9 °F (36 6 °C)-98 1 °F (36 7 °C)] 98 1 °F (36 7 °C)  HR:  [] 82  Resp:  [15-16] 16  BP: (131-164)/(64-95) 147/76    Signature / Title: Gaytahri Saxena DO, Resident - Ob/Gyn    Date: 2018  Time: 2:04 PM

## 2018-01-27 LAB
ABO GROUP BLD: NORMAL
BLD GP AB SCN SERPL QL: NEGATIVE
ERYTHROCYTE [DISTWIDTH] IN BLOOD BY AUTOMATED COUNT: 15.1 % (ref 11.6–15.1)
FETAL CELL SCN BLD QL ROSETTE: NEGATIVE
HCT VFR BLD AUTO: 25.5 % (ref 34.8–46.1)
HGB BLD-MCNC: 8.5 G/DL (ref 11.5–15.4)
MCH RBC QN AUTO: 29 PG (ref 26.8–34.3)
MCHC RBC AUTO-ENTMCNC: 33.3 G/DL (ref 31.4–37.4)
MCV RBC AUTO: 87 FL (ref 82–98)
PLATELET # BLD AUTO: 194 THOUSANDS/UL (ref 149–390)
PMV BLD AUTO: 11.6 FL (ref 8.9–12.7)
RBC # BLD AUTO: 2.93 MILLION/UL (ref 3.81–5.12)
RH BLD: NEGATIVE
WBC # BLD AUTO: 12.93 THOUSAND/UL (ref 4.31–10.16)

## 2018-01-27 PROCEDURE — 86901 BLOOD TYPING SEROLOGIC RH(D): CPT | Performed by: OBSTETRICS & GYNECOLOGY

## 2018-01-27 PROCEDURE — 85461 HEMOGLOBIN FETAL: CPT | Performed by: OBSTETRICS & GYNECOLOGY

## 2018-01-27 PROCEDURE — 86900 BLOOD TYPING SEROLOGIC ABO: CPT | Performed by: OBSTETRICS & GYNECOLOGY

## 2018-01-27 PROCEDURE — 85027 COMPLETE CBC AUTOMATED: CPT | Performed by: OBSTETRICS & GYNECOLOGY

## 2018-01-27 PROCEDURE — 86850 RBC ANTIBODY SCREEN: CPT | Performed by: OBSTETRICS & GYNECOLOGY

## 2018-01-27 RX ORDER — NALBUPHINE HCL 10 MG/ML
10 AMPUL (ML) INJECTION EVERY 4 HOURS PRN
Status: DISCONTINUED | OUTPATIENT
Start: 2018-01-27 | End: 2018-01-27

## 2018-01-27 RX ORDER — NALBUPHINE HCL 10 MG/ML
5 AMPUL (ML) INJECTION
Status: DISCONTINUED | OUTPATIENT
Start: 2018-01-27 | End: 2018-01-29 | Stop reason: HOSPADM

## 2018-01-27 RX ADMIN — IBUPROFEN 600 MG: 600 TABLET ORAL at 18:40

## 2018-01-27 RX ADMIN — DOCUSATE SODIUM 100 MG: 100 CAPSULE, LIQUID FILLED ORAL at 18:41

## 2018-01-27 RX ADMIN — DOCUSATE SODIUM 100 MG: 100 CAPSULE, LIQUID FILLED ORAL at 08:55

## 2018-01-27 RX ADMIN — KETOROLAC TROMETHAMINE 30 MG: 30 INJECTION, SOLUTION INTRAMUSCULAR at 00:42

## 2018-01-27 RX ADMIN — OXYCODONE HYDROCHLORIDE AND ACETAMINOPHEN 2 TABLET: 5; 325 TABLET ORAL at 13:56

## 2018-01-27 RX ADMIN — NALBUPHINE HYDROCHLORIDE 10 MG: 10 INJECTION, SOLUTION INTRAMUSCULAR; INTRAVENOUS; SUBCUTANEOUS at 00:57

## 2018-01-27 RX ADMIN — ACETAMINOPHEN 650 MG: 325 TABLET, FILM COATED ORAL at 05:38

## 2018-01-27 RX ADMIN — OXYCODONE HYDROCHLORIDE AND ACETAMINOPHEN 1 TABLET: 5; 325 TABLET ORAL at 19:59

## 2018-01-27 RX ADMIN — SODIUM CHLORIDE, SODIUM LACTATE, POTASSIUM CHLORIDE, AND CALCIUM CHLORIDE 125 ML/HR: .6; .31; .03; .02 INJECTION, SOLUTION INTRAVENOUS at 05:49

## 2018-01-27 RX ADMIN — Medication 62.5 MILLI-UNITS/MIN: at 05:50

## 2018-01-27 RX ADMIN — KETOROLAC TROMETHAMINE 30 MG: 30 INJECTION, SOLUTION INTRAMUSCULAR at 06:39

## 2018-01-27 NOTE — PROGRESS NOTES
Date: 18  Procedure: Repeat low transverse  section  Postpartum/Postop Day #:  1    SUBJECTIVE:  Pain: no  Tolerating Oral Intake: yes   Voiding: not yet - ray just removed  Flatus: yes  Bowel Movement: no  Ambulating: yes - just starting to get OOB  Breastfeeding: yes  Chest Pain: no  Shortness of Breath: no  Leg Pain/Discomfort: no  Lochia: moderate      OBJECTIVE:   Vitals: Temp:  [97 9 °F (36 6 °C)-98 5 °F (36 9 °C)] 97 9 °F (36 6 °C)  HR:  [] 66  Resp:  [15-20] 18  BP: (125-164)/(62-95) 125/62  General: No Acute Distress well nourished white female  Abdomen: Soft, non-distended, non-tender, no rebound, guarding or CVA tenderness   Fundus: Firm & Non-Tender , Fundal Location: -1 cm below the umbilicus  Incision: clean, dry, and intact and closed with staples  Lower Extremities: Non-tender and benign    LABS / TESTS / MEDICATION:    Recent Results (from the past 72 hour(s))   Type and screen and continue to monitor patient    Collection Time: 18  8:11 AM   Result Value Ref Range    ABO Grouping A     Rh Factor Negative     Antibody Screen Negative     Specimen Expiration Date 2018    CBC and differential    Collection Time: 18  8:11 AM   Result Value Ref Range    WBC 11 44 (H) 4 31 - 10 16 Thousand/uL    RBC 4 06 3 81 - 5 12 Million/uL    Hemoglobin 11 8 11 5 - 15 4 g/dL    Hematocrit 34 5 (L) 34 8 - 46 1 %    MCV 85 82 - 98 fL    MCH 29 1 26 8 - 34 3 pg    MCHC 34 2 31 4 - 37 4 g/dL    RDW 14 6 11 6 - 15 1 %    MPV 11 8 8 9 - 12 7 fL    Platelets 015 185 - 607 Thousands/uL    nRBC 0 /100 WBCs    Neutrophils Relative 77 (H) 43 - 75 %    Lymphocytes Relative 15 14 - 44 %    Monocytes Relative 8 4 - 12 %    Eosinophils Relative 0 0 - 6 %    Basophils Relative 0 0 - 1 %    Neutrophils Absolute 8 58 (H) 1 85 - 7 62 Thousands/µL    Lymphocytes Absolute 1 75 0 60 - 4 47 Thousands/µL    Monocytes Absolute 0 93 0 17 - 1 22 Thousand/µL    Eosinophils Absolute 0 03 0 00 - 0 61 Thousand/µL    Basophils Absolute 0 03 0 00 - 0 10 Thousands/µL   Comprehensive metabolic panel    Collection Time: 01/26/18  8:43 AM   Result Value Ref Range    Sodium 137 136 - 145 mmol/L    Potassium 3 8 3 5 - 5 3 mmol/L    Chloride 106 100 - 108 mmol/L    CO2 22 21 - 32 mmol/L    Anion Gap 9 4 - 13 mmol/L    BUN 8 5 - 25 mg/dL    Creatinine 0 58 (L) 0 60 - 1 30 mg/dL    Glucose 85 65 - 140 mg/dL    Calcium 8 9 8 3 - 10 1 mg/dL    AST 27 5 - 45 U/L    ALT 43 12 - 78 U/L    Alkaline Phosphatase 219 (H) 46 - 116 U/L    Total Protein 7 6 6 4 - 8 2 g/dL    Albumin 2 9 (L) 3 5 - 5 0 g/dL    Total Bilirubin 0 61 0 20 - 1 00 mg/dL    eGFR 121 ml/min/1 73sq m   Urinalysis with microscopic    Collection Time: 01/26/18  8:43 AM   Result Value Ref Range    Clarity, UA Cloudy     Color, UA Yellow     Specific Gravity, UA 1 009 1 003 - 1 030    pH, UA 7 5 4 5 - 8 0    Glucose, UA Negative Negative mg/dl    Ketones, UA Trace (A) Negative mg/dl    Blood, UA Negative Negative    Protein, UA Negative Negative mg/dl    Nitrite, UA Negative Negative    Bilirubin, UA Negative Negative    Urobilinogen, UA 0 2 0 2, 1 0 E U /dl E U /dl    Leukocytes, UA Trace (A) Negative    WBC, UA 2-4 (A) None Seen, 0-5, 5-55, 5-65 /hpf    RBC, UA None Seen None Seen, 0-5 /hpf    Hyaline Casts, UA None Seen None Seen /lpf    Bacteria, UA Occasional None Seen, Occasional /hpf    Epithelial Cells None Seen None Seen, Occasional /hpf   Protein / creatinine ratio, urine    Collection Time: 01/26/18  8:43 AM   Result Value Ref Range    Creatinine, Ur 52 1 mg/dL    Protein Urine Random 6 mg/dL    Prot/Creat Ratio, Ur 0 12 (H) 0 00 - 0 10   Blood gas, venous, cord    Collection Time: 01/26/18 10:48 AM   Result Value Ref Range    pH, Cord Bashir 7 361 7 190 - 7 490    pCO2, Cord Bashir 39 5 27 0 - 43 0 mm HG    pO2, Cord Bashir 26 2 15 0 - 45 0 mm HG    HCO3, Cord Bashir 21 9 12 2 - 28 6 mmol/L    Base Exc, Cord Bashir -3 2 (L) 1 0 - 9 0 mmol/L    O2 Cont, Cord Bashir 15 2 mL/dL    O2 HGB,VENOUS CORD 62 9 %   Blood gas, arterial, cord    Collection Time: 18 10:48 AM   Result Value Ref Range    pH, Cord Art 7 377 7 230 - 7 430    pCO2, Cord Art 39 1 30 0 - 60 0    pO2, Cord Art 26 6 (H) 5 0 - 25 0 mm HG    HCO3, Cord Art 22 5 17 3 - 27 3 mmol/L    Base Exc, Cord Art -2 3 (L) 3 0 - 11 0 mmol/L    O2 Content, Cord Art 16 3 ml/dl    O2 Hgb, Arterial Cord 66 4 %         docusate sodium 100 mg Oral BID   ketorolac 30 mg Intravenous Q6H [address]   Rho(D) immune globulin 300 mcg Intramuscular Once       acetaminophen 650 mg Q6H PRN   calcium carbonate 1,000 mg Daily PRN   dexamethasone 8 mg Once PRN   diphenhydrAMINE 25 mg Q6H PRN   diphenhydrAMINE 25 mg Q6H PRN   fentaNYL 25 mcg Q3 min PRN   HYDROmorphone 1 mg Q2H PRN   ibuprofen 600 mg Q6H PRN   metoclopramide 5 mg Q6H PRN   nalbuphine 5 mg Q3H PRN   ondansetron 4 mg Q8H PRN   ondansetron 4 mg Q4H PRN   oxyCODONE-acetaminophen 1 tablet Q4H PRN   oxyCODONE-acetaminophen 1 tablet Q6H PRN   oxyCODONE-acetaminophen 2 tablet Q4H PRN       ASSESSMENT AND PLAN:   27 y o  [id number] s/p , due to planned repeat - prior c/s x1 post-operative day 1  Continue routine postpartum / postoperative  care, encourage ambulation, advance diet as tolerated      Signature / Title: Melanie Armstrong MD, Ob/Gyn  Date: 2018  Time: 7:34 AM

## 2018-01-27 NOTE — LACTATION NOTE
This note was copied from a baby's chart  Mom states infant is feeding well  Is also being supplemented for some low blood sugars  Given admission breastfeeding pkt  Discussed frequent feeding attempts due to low blood sugars  Encouraged to calf or additional assistance a needed

## 2018-01-28 PROCEDURE — 90715 TDAP VACCINE 7 YRS/> IM: CPT | Performed by: OBSTETRICS & GYNECOLOGY

## 2018-01-28 RX ORDER — SIMETHICONE 80 MG
80 TABLET,CHEWABLE ORAL EVERY 6 HOURS PRN
Status: DISCONTINUED | OUTPATIENT
Start: 2018-01-28 | End: 2018-01-29 | Stop reason: HOSPADM

## 2018-01-28 RX ORDER — ECHINACEA PURPUREA EXTRACT 125 MG
2 TABLET ORAL AS NEEDED
Status: DISCONTINUED | OUTPATIENT
Start: 2018-01-28 | End: 2018-01-29 | Stop reason: HOSPADM

## 2018-01-28 RX ORDER — FERROUS SULFATE 325(65) MG
325 TABLET ORAL 2 TIMES DAILY WITH MEALS
Status: DISCONTINUED | OUTPATIENT
Start: 2018-01-28 | End: 2018-01-29 | Stop reason: HOSPADM

## 2018-01-28 RX ADMIN — FERROUS SULFATE TAB 325 MG (65 MG ELEMENTAL FE) 325 MG: 325 (65 FE) TAB at 17:53

## 2018-01-28 RX ADMIN — HUMAN RHO(D) IMMUNE GLOBULIN 300 MCG: 300 INJECTION, SOLUTION INTRAMUSCULAR at 00:02

## 2018-01-28 RX ADMIN — OXYCODONE HYDROCHLORIDE AND ACETAMINOPHEN 1 TABLET: 5; 325 TABLET ORAL at 07:32

## 2018-01-28 RX ADMIN — DOCUSATE SODIUM 100 MG: 100 CAPSULE, LIQUID FILLED ORAL at 17:54

## 2018-01-28 RX ADMIN — IBUPROFEN 600 MG: 600 TABLET ORAL at 17:54

## 2018-01-28 RX ADMIN — TETANUS TOXOID, REDUCED DIPHTHERIA TOXOID AND ACELLULAR PERTUSSIS VACCINE, ADSORBED 0.5 ML: 5; 2.5; 8; 8; 2.5 SUSPENSION INTRAMUSCULAR at 07:33

## 2018-01-28 RX ADMIN — IBUPROFEN 600 MG: 600 TABLET ORAL at 00:14

## 2018-01-28 RX ADMIN — OXYCODONE HYDROCHLORIDE AND ACETAMINOPHEN 1 TABLET: 5; 325 TABLET ORAL at 13:46

## 2018-01-28 RX ADMIN — DOCUSATE SODIUM 100 MG: 100 CAPSULE, LIQUID FILLED ORAL at 07:33

## 2018-01-28 NOTE — PROGRESS NOTES
I personally saw and examined the patient today    Agree with the resident's note    Baby doing well    Continue current plan of care

## 2018-01-28 NOTE — PROGRESS NOTES
Progress Note - OB/GYN   Andrei Garcia 27 y o  female MRN: [id number]  Unit/Bed#: -01 Encounter: [id number]      Andrei Garcia is a patient of Dr William Martines and Dr Rebecca Guerreroor:  Post operative day #2 s/p RLTCS, stable, and doing well    Plan:  1  Hemodynamically stable   - Pre-op Hb 11 8 --> post-op Hb 8 5, iron sulfate 325mg ordered BID   - Vitals WNL, currently asymptomatic  2  Continue routine post partum care   - Encourage ambulation   - Encourage breastfeeding  3  Continue current meds   - See list below   - Pain adequately controlled with PO analgesics  4  Disposition   - Stable   - Anticipate discharge home tomorrow POD#3      Subjective/Objective     Chief Complaint:     Post operative day #2 from a RLTCS with no complaints today    Subjective:     Pain: no  Tolerating Oral Intake: yes  Voiding: yes  Flatus: yes  Bowel Movement: no  Ambulating: yes  Breastfeeding: Breastfeeding  Chest Pain: no  Shortness of Breath: no  Leg Pain/Discomfort: no  Lochia: minimal    Objective:   Vitals: /72   Pulse 68   Temp 97 6 °F (36 4 °C) (Oral)   Resp 18   Ht 5' 5" (1 651 m)   Wt 95 3 kg (210 lb)   LMP 04/26/2017   SpO2 98%   Breastfeeding?  Yes   BMI 34 95 kg/m²       Intake/Output Summary (Last 24 hours) at 01/28/18 0928  Last data filed at 01/27/18 1357   Gross per 24 hour   Intake              750 ml   Output              300 ml   Net              450 ml       Lab Results   Component Value Date    WBC 12 93 (H) 01/27/2018    HGB 8 5 (L) 01/27/2018    HCT 25 5 (L) 01/27/2018    MCV 87 01/27/2018     01/27/2018       Meds/Allergies   Current Facility-Administered Medications   Medication Dose Route Frequency    acetaminophen (TYLENOL) tablet 650 mg  650 mg Oral Q6H PRN    calcium carbonate (TUMS) chewable tablet 1,000 mg  1,000 mg Oral Daily PRN    diphenhydrAMINE (BENADRYL) tablet 25 mg  25 mg Oral Q6H PRN    docusate sodium (COLACE) capsule 100 mg  100 mg Oral BID    fentaNYL (SUBLIMAZE) injection 25 mcg  25 mcg Intravenous Q3 min PRN    HYDROmorphone (DILAUDID) injection 1 mg  1 mg Intravenous Q2H PRN    ibuprofen (MOTRIN) tablet 600 mg  600 mg Oral Q6H PRN    lactated ringers infusion  125 mL/hr Intravenous Continuous    nalbuphine (NUBAIN) injection 5 mg  5 mg Intravenous Q3H PRN    ondansetron (ZOFRAN) injection 4 mg  4 mg Intravenous Q8H PRN    oxyCODONE-acetaminophen (PERCOCET) 5-325 mg per tablet 1 tablet  1 tablet Oral Q4H PRN    oxyCODONE-acetaminophen (PERCOCET) 5-325 mg per tablet 1 tablet  1 tablet Oral Q6H PRN    oxyCODONE-acetaminophen (PERCOCET) 5-325 mg per tablet 2 tablet  2 tablet Oral Q4H PRN    oxytocin (PITOCIN) 30 Units in lactated ringers 500 mL infusion  1-30 winter-units/min Intravenous Titrated    oxytocin (PITOCIN) 30 Units in lactated ringers 500 mL infusion  1-30 winter-units/min Intravenous Titrated    sodium chloride (OCEAN) 0 65 % nasal spray 2 spray  2 spray Each Nare PRN       Physical Exam:  General: in no apparent distress and well developed and well nourished  Cardiovascular: Cor RRR  Lungs: clear to auscultation bilaterally  Abdomen: abdomen is soft without significant tenderness, masses, organomegaly or guarding; incsion c/d/i  Fundus: Firm and non-tender, 2 below the umbilicus  Lower extremeties: nontender    Labs/Tests:   No results found for this or any previous visit (from the past 24 hour(s))      MEDS:   Current Facility-Administered Medications   Medication Dose Route Frequency    acetaminophen (TYLENOL) tablet 650 mg  650 mg Oral Q6H PRN    calcium carbonate (TUMS) chewable tablet 1,000 mg  1,000 mg Oral Daily PRN    diphenhydrAMINE (BENADRYL) tablet 25 mg  25 mg Oral Q6H PRN    docusate sodium (COLACE) capsule 100 mg  100 mg Oral BID    fentaNYL (SUBLIMAZE) injection 25 mcg  25 mcg Intravenous Q3 min PRN    HYDROmorphone (DILAUDID) injection 1 mg  1 mg Intravenous Q2H PRN    ibuprofen (MOTRIN) tablet 600 mg  600 mg Oral Q6H PRN    lactated ringers infusion  125 mL/hr Intravenous Continuous    nalbuphine (NUBAIN) injection 5 mg  5 mg Intravenous Q3H PRN    ondansetron (ZOFRAN) injection 4 mg  4 mg Intravenous Q8H PRN    oxyCODONE-acetaminophen (PERCOCET) 5-325 mg per tablet 1 tablet  1 tablet Oral Q4H PRN    oxyCODONE-acetaminophen (PERCOCET) 5-325 mg per tablet 1 tablet  1 tablet Oral Q6H PRN    oxyCODONE-acetaminophen (PERCOCET) 5-325 mg per tablet 2 tablet  2 tablet Oral Q4H PRN    oxytocin (PITOCIN) 30 Units in lactated ringers 500 mL infusion  1-30 winter-units/min Intravenous Titrated    oxytocin (PITOCIN) 30 Units in lactated ringers 500 mL infusion  1-30 winter-units/min Intravenous Titrated    sodium chloride (OCEAN) 0 65 % nasal spray 2 spray  2 spray Each Nare PRN     Invasive Devices     Peripheral Intravenous Line            Peripheral IV 01/26/18 Right Wrist 2 days                Matthew Umana DO  PGY-1 OB/GYN   1/28/2018 9:28 AM

## 2018-01-28 NOTE — LACTATION NOTE
This note was copied from a baby's chart  Infant still needing to be supplemented with donor milk  infant assisted to breast in football hold with SNS in place  Good latch/suck noted  Reviewed signs of good latch and positioning and milk transfer  Mom encouraged to continue to also pump after feedings

## 2018-01-29 VITALS
WEIGHT: 210 LBS | TEMPERATURE: 97.7 F | SYSTOLIC BLOOD PRESSURE: 153 MMHG | RESPIRATION RATE: 18 BRPM | DIASTOLIC BLOOD PRESSURE: 83 MMHG | BODY MASS INDEX: 34.99 KG/M2 | HEART RATE: 72 BPM | OXYGEN SATURATION: 98 % | HEIGHT: 65 IN

## 2018-01-29 LAB — RPR SER QL: NORMAL

## 2018-01-29 RX ORDER — IBUPROFEN 600 MG/1
600 TABLET ORAL EVERY 6 HOURS PRN
Qty: 30 TABLET | Refills: 0 | Status: SHIPPED | OUTPATIENT
Start: 2018-01-29 | End: 2018-04-18 | Stop reason: SDUPTHER

## 2018-01-29 RX ORDER — DOCUSATE SODIUM 100 MG/1
100 CAPSULE, LIQUID FILLED ORAL 2 TIMES DAILY PRN
Qty: 10 CAPSULE | Refills: 0 | Status: SHIPPED | OUTPATIENT
Start: 2018-01-29 | End: 2018-04-18 | Stop reason: SDUPTHER

## 2018-01-29 RX ORDER — OXYCODONE HYDROCHLORIDE AND ACETAMINOPHEN 5; 325 MG/1; MG/1
1 TABLET ORAL EVERY 4 HOURS PRN
Qty: 20 TABLET | Refills: 0 | Status: SHIPPED | OUTPATIENT
Start: 2018-01-29 | End: 2018-02-08

## 2018-01-29 RX ORDER — ACETAMINOPHEN 325 MG/1
650 TABLET ORAL EVERY 4 HOURS PRN
Qty: 30 TABLET | Refills: 0 | Status: SHIPPED | OUTPATIENT
Start: 2018-01-29 | End: 2019-06-05

## 2018-01-29 RX ADMIN — SIMETHICONE CHEW TAB 80 MG 80 MG: 80 TABLET ORAL at 08:17

## 2018-01-29 RX ADMIN — SIMETHICONE CHEW TAB 80 MG 80 MG: 80 TABLET ORAL at 00:20

## 2018-01-29 RX ADMIN — FERROUS SULFATE TAB 325 MG (65 MG ELEMENTAL FE) 325 MG: 325 (65 FE) TAB at 08:04

## 2018-01-29 RX ADMIN — DOCUSATE SODIUM 100 MG: 100 CAPSULE, LIQUID FILLED ORAL at 08:04

## 2018-01-29 RX ADMIN — IBUPROFEN 600 MG: 600 TABLET ORAL at 05:30

## 2018-01-29 NOTE — DISCHARGE INSTRUCTIONS
Section   WHAT YOU SHOULD KNOW:   A  delivery, or , is abdominal surgery to deliver your baby  There are many reasons you may need a   · A  may be scheduled before labor if you had a  with your last baby  It may be scheduled if your baby is not positioned normally, or you are pregnant with more than 1 baby  · Your caregiver may perform an emergency  during labor to prevent life-threatening complications for you or your baby  A  may be done if your cervix does not dilate after several hours of active labor  · Other reasons for a  include maternal infections and problems with the placenta  AFTER YOU LEAVE:   Medicines:   · Prescription pain medicine  may be given  Ask how to take this medicine safely  · Acetaminophen  decreases pain and fever  It is available without a doctor's order  Ask how much to take and how often to take it  Follow directions  Acetaminophen can cause liver damage if not taken correctly  · NSAIDs  help decrease swelling and pain or fever  This medicine is available with or without a doctor's order  NSAIDs can cause stomach bleeding or kidney problems in certain people  If you take blood thinner medicine, always ask your obstetrician if NSAIDs are safe for you  Always read the medicine label and follow directions  · Take your medicine as directed  Contact your obstetrician (OB) if you think your medicine is not helping or if you have side effects  Tell him if you are allergic to any medicine  Keep a list of the medicines, vitamins, and herbs you take  Include the amounts, and when and why you take them  Bring the list or the pill bottles to follow-up visits  Carry your medicine list with you in case of an emergency  Follow up with your OB as directed: You may need to return to have your stitches or staples removed   Write down your questions so you remember to ask them during your visits  Wound care:  Carefully wash your wound with soap and water every day  Keep your wound clean and dry  Wear loose, comfortable clothes that do not rub against your wound  Ask your OB about bathing and showering  Drink plenty of liquids: You can lower your risk for a blood clot if you drink plenty of liquids  Ask how much liquid to drink each day and which liquids are best for you  Limit activity until you have fully recovered from surgery:   · Ask when it is safe for you to drive, walk up stairs, lift heavy objects, and have sex  · Ask when it is okay to exercise, and what types of exercise to do  Start slowly and do more as you get stronger  Contact your OB if:   · You have heavy vaginal bleeding that fills 1 or more sanitary pads in 1 hour  · You have a fever  · Your incision is swollen, red, or draining pus  · You have questions or concerns about yourself or your baby  Seek care immediately or call 911 if:   · Blood soaks through your bandage  · Your stitches come apart  · You feel lightheaded, short of breath, and have chest pain  · You cough up blood  · Your arm or leg feels warm, tender, and painful  It may look swollen and red  © 2014 [address] is for End User's use only and may not be sold, redistributed or otherwise used for commercial purposes  All illustrations and images included in CareNotes® are the copyrighted property of A D A M , Inc  or Jesse Manuel  The above information is an  only  It is not intended as medical advice for individual conditions or treatments  Talk to your doctor, nurse or pharmacist before following any medical regimen to see if it is safe and effective for you  Iron Rich Diet   American Dietetic Association (ADA): Nutrition Care Manual(R)  American Dietetic Association (ADA)  [address], [place]  2010   Available from URL: https://Fyusion/  As accessed 2010-08-30  Marco Antonio FRIAS , & Earle Canales : Individualized treatment for iron-deficiency anemia in adults  Am J Med 2008; 121(11):943-948  [employer] of [employer]stetricians and Gynecologists : ACOG Practice Bulletin No  95: anemia in pregnancy  Obstet Gynecol 2008; 112(1):201-207  [place] SF : Iron deficiency anemia  Nutr Clin Pract 2008; 23(2):128-141  Catalino Coy, Dom AR, & American Dietetic Association: Position of the American Dietetic Association: vegetarian diets  J Am Diet Assoc 2009; 109(7):2884-3240  [place] SJ : Iron nutrition in the [facility]: getting the balance right  Proc Nutr Soc 2004; 63(4):519-528  Byron B : Iron-deficiency anemia  In: Johanna Del Toroer, Alma RE, Calin HB, et Zane [facility], 18th [address], [place], [place], [facility]  Milton CC , Wall CR , Stephen Loza , et al: Policy statement on iron deficiency in pre-school-aged children  [address] [place] [address] 2007; 43(7-8):513-521  Ricky BISHOP & Emily S : [employer], Nutrition and Diet Therapy, 11th ed, [place], [place], 2004  Office of Dietary Supplements, [facility]: Dietary Supplement Fact Sheet: Iron  Office of Dietary SupplementsVenancio MD  2007  Available from URL: http://ods  od nih gov/factsheets/iron  asp  As accessed 2010-08-24  Anita PANDA & Jammie CT : Position of the American Dietetic Association: child and adolescent nutrition assistance programs  J Am Diet Assoc 2010; 110(5):791-799  US Department of Agriculture, Agricultural Research Service: [place] for [employer], Release 22, Iron, Fe (mg) content of selected foods per common measure, sorted by nutrient content  US Department of Agriculture  [place]  2009  Available from URL: [facility]MitzvahSearch dk  pdf  As accessed 2010-08-23  [place]   Food & Drug Administration: What You Need to Know About Mercury in Fish and Shellfish  [place]  Food & Drug Administration  Jeanie Schofield MD  2004  Available from URL: KidsMaterial hu  As accessed 2010-10-08  © 2017 [address]  Information is for End User's use only and may not be sold, redistributed or otherwise used for commercial purposes  All illustrations and images included in CareNotes® are the copyrighted property of A D A M , Inc  or Jesse Manuel  The above information is an  only  It is not intended as medical advice for individual conditions or treatments  Talk to your doctor, nurse or pharmacist before following any medical regimen to see if it is safe and effective for you  Iron Supplements (By mouth)   Treats low blood iron or anemia by helping your body make red blood cells  Brand Name(s): Beef/Iron/Wine, Bifera, BiferaRx, Corvite FE, Duofer, EZFE 200, Enfamil Mekhi-In-Sol, Family Pharmacy Iron Tablets, Fe-20, Femcon Fe, Femiron, Feosol, Mekhi-Iron, Ferate, Fergon   There may be other brand names for this medicine  When This Medicine Should Not Be Used: You should not use this medicine if you have had an allergic reaction to iron supplements, or if you have a condition called hemachromatosis (iron overload disease) or hemosiderosis (iron in the lungs), or any type of anemia that is not caused by iron deficiency  How to Use This Medicine:   Liquid Filled Capsule, Coated Tablet, Tablet, Capsule, Chewable Tablet, Liquid, Long Acting Capsule, Long Acting Tablet  · Your doctor will tell you how much of this medicine to take and how often  Do not take more medicine or take it more often than your doctor tells you to  Carefully follow your doctor's instructions about any special diet  · It is best to take this medicine on an empty stomach, 1 hour before or 2 hours after a meal  Take the medicine with a full glass or water or fruit juice   If the medicine upsets your stomach, you may take it with food  · The chewable tablet must be chewed or crushed before you swallow it  · Measure the oral liquid medicine with a marked measuring spoon or medicine cup  · The oral liquid may stain your teeth  These stains can be prevented by mixing the medicine with water or other liquids (such as fruit juice, tomato juice), and drinking the medicine with a straw  To remove any iron stains, brush your teeth with baking soda or peroxide  If a dose is missed:   · If you miss a dose or forget to take your medicine, take it as soon as you can  If it is almost time for your next dose, wait until then to take the medicine and skip the missed dose  · Do not use extra medicine to make up for a missed dose  How to Store and Dispose of This Medicine:   · Store the medicine at room temperature, away from heat, moisture, and direct light  · Keep all medicine away from children, and never share your medicine with anyone  Drugs and Foods to Avoid:   Ask your doctor or pharmacist before using any other medicine, including over-the-counter medicines, vitamins, and herbal products  · Do not take iron supplements by mouth if you are also receiving iron injections  · Make sure your doctor knows if you are also using phenytoin (Dilantin®), acetohydroxamic acid (Lithostat®), or antibiotics such as demeclocycline, doxycycline (Vibramycin®), Cipro®, Levaquin®, minocycline, moxifloxacin (Avelox®), Tequin®, or tetracycline  · Tell your doctor if you are using antacids (such as Maalox® or Mylanta®)  · Avoid the following foods, or eat them in small amounts at least 1 hour before or 2 hours after taking your iron: eggs, milk, cheese, yogurt, tea or coffee, whole-grain cereals, and breads    Warnings While Using This Medicine:   · Make sure your doctor knows if you are pregnant or breastfeeding, or if you have stomach or intestinal problems, an active infection, diabetes, porphyria, or other medical problems  · Make sure any doctor or dentist who treats you knows that you are using this medicine  Iron may affect the results of certain medical tests  · Iron can cause your stools to be darker in color  This is normal and is not a cause for concern  Possible Side Effects While Using This Medicine:   Call your doctor right away if you notice any of these side effects:  · Bloody diarrhea  · Bluish-colored lips, hands, or fingernails  · Chest pain  · Fever  · Pale or clammy skin  · Severe or continuing stomach cramps, vomiting (with or without blood)  · Shallow breathing, weakness, weak but fast heartbeat  If you notice these less serious side effects, talk with your doctor:   · Constipation, diarrhea, nausea  · Dark-colored urine  · Leg cramps  If you notice other side effects that you think are caused by this medicine, tell your doctor  Call your doctor for medical advice about side effects  You may report side effects to FDA at 3-685-FDA-0727  © 2017 [address]  Information is for End User's use only and may not be sold, redistributed or otherwise used for commercial purposes  The above information is an  only  It is not intended as medical advice for individual conditions or treatments  Talk to your doctor, nurse or pharmacist before following any medical regimen to see if it is safe and effective for you  Iron Rich Diet   WHAT YOU NEED TO KNOW:   An iron-rich diet includes foods that are good sources of iron  People need extra iron during childhood, adolescence (teenage years), and pregnancy  Iron is a mineral that your body needs to make hemoglobin  Hemoglobin is part of your blood and helps carry oxygen from your lungs to the rest of your body  You may need to follow this diet to treat or prevent a low blood iron level or iron deficiency anemia     DISCHARGE INSTRUCTIONS:   Daily iron needs:   · Males:      ¨ 3to 1years old: 7 mg    ¨ 3to 6years old: 8 mg    ¨ 5to 15years old: 8 mg    ¨ 15to 25years old: 11 mg    ¨ 19 years and older: 8 mg    · Females:      ¨ 3to 1years old: 7 mg    ¨ 3to 6years old: 10 mg    ¨ 5to 15years old: 8 mg    ¨ 15to 25years old: 15 mg    ¨ 19 to 50 years: 18 mg    ¨ Over 46years old: 8 mg    ¨ Pregnant women:  27 mg  Foods that contain iron:   · Meat, fish, and poultry are good sources of iron  They contain heme iron, a form of iron that your body absorbs very well  Fruit, vegetables, eggs, and grains such as pasta, rice, and cereal also contain iron  They contain nonheme iron, a form of iron that is not absorbed as well as heme iron  You can absorb more iron from these foods by eating a food that is high in vitamin C at the same time  You can also absorb more nonheme iron by eating a food from the meat, fish, and poultry group at the same time  · Fish and shellfish contain some mercury, a metal that can be harmful to the body  Children and unborn babies are at higher risk for harm caused by mercury  Children and pregnant women should avoid eating fish high in mercury, such as shark and swordfish  They should also eat only fish that are lower in mercury, such as salmon, canned light tuna, and catfish  Limit the amount of low-mercury fish and shellfish you eat to less than 12 ounces per week    Iron-rich foods:   · Foods that contain 2 mg or more per serving:      ¨ 3 ounces of cooked beef (greer, eye of round) or cooked turkey (dark meat)    ¨ ½ cup of beans (black, kidney, or lentil, or soybeans)    ¨ ½ cup of tofu    ¨ 1 medium baked potato    ¨ 1 cup of cooked artichoke or cooked spinach    ¨ ¾ cup of instant oatmeal    ¨ 1 cup of corn flakes    · Foods that contain 1 to 2 mg per serving:      ¨ 3 ounces of chicken    ¨ 3 ounces of pork    ¨ 3 ounces of turkey (light meat)    ¨ 3 ounces of light tuna    ¨ ½ cup of seedless, packed raisins    ¨ 1 slice of whole-wheat or white bread  Good sources of vitamin C:  Eat a serving of vitamin C with any iron-rich food to help your body absorb more iron  The following fruits and vegetables are good sources of vitamin C:  · 1 cup of fresh orange juice (124 mg) or pink grapefruit juice (83 mg)    · 1 cup of strawberries (106 mg)    · 1 cup of diced cantaloupe (68 mg)     · 1 cup of sweet yellow pepper (283 mg)    · 1 cup of fresh, boiled broccoli (116 mg) or cooked brussels sprouts (97 mg)    · 1 cup of kale (53 mg)    · 1 cup of tomato juice (45 mg)  Other guidelines to follow:   · Tea and coffee can decrease the amount of iron that your body absorbs from iron-rich foods  Drink coffee and tea separately from meals that contain iron-rich foods  · Children over the age of 1 year only need about 24 ounces of cow's milk each day  When children drink too much milk, they may eat fewer iron-rich foods  This may cause them to have a low level of iron in their blood  Risks: If you do not eat iron-rich foods and vitamin C every day, you may have low blood iron levels  This may lead to iron deficiency anemia, especially during periods when your body needs extra iron  Iron deficiency anemia may cause problems with your child's growth and development  If you have iron deficiency anemia, you may develop other health problems  © 2017 [address] Information is for End User's use only and may not be sold, redistributed or otherwise used for commercial purposes  All illustrations and images included in CareNotes® are the copyrighted property of A D A M , Inc  or Jesse Manuel  The above information is an  only  It is not intended as medical advice for individual conditions or treatments  Talk to your doctor, nurse or pharmacist before following any medical regimen to see if it is safe and effective for you

## 2018-01-29 NOTE — LACTATION NOTE
This note was copied from a baby's chart  CONSULT - LACTATION  Baby Boy  Helio Amanda 3 days male MRN: [id number]    [facility] Room / Bed: (N)/(N) Encounter: [id number]      Breastfeeding discharge booklet given and reviewed  Emphasized 8 or more  feedings in a 24 hour period with each feeding being at least 10 minutes, what to expect for the number of diapers per day of life and the progression of properties of the  stooling pattern  Discussed s/s that breastfeeding is going well after day 4 and when to get help from a pediatrician or lactation support person after day 4  Booklet included Breast Pumping Instructions, When You Go Back to Work or School, and Breastfeeding Resources for after discharge including access to the number for the [employer]  Mom states latching is better but baby is initially sleepy at the beginning of the feed  Mom still using donor milk with SNS at breast  Instructed mom to call [facility] when needing help with set up and latch if needed            Maternal Information     MOTHER:  Nadege Amanda  Maternal Age: 27 y o    OB History: #: 1, Date: 13, Sex: Female, Weight: 3884 g (8 lb 9 oz), GA: 40w0d, Delivery: , Low Transverse, Apgar1: None, Apgar5: None, Living: Living, Birth Comments: None    #: 2, Date: 18, Sex: Male, Weight: 4309 g (9 lb 8 oz), GA: 39w2d, Delivery: , Low Transverse, Apgar1: 9, Apgar5: 9, Living: Living, Birth Comments: None        Lactation history:   Has patient previously breast fed: Yes   How long had patient previously breast fed: X 9 mos   Previous breast feeding complications: None     Past Surgical History:   Procedure Laterality Date    APPENDECTOMY  2005    CERVICAL BIOPSY N/A 2016    Procedure: LEEP ;  Surgeon: Amanda Powers MD;  [facility]: [facility];  Service:     CERVICAL BIOPSY  W/ LOOP ELECTRODE EXCISION  2016   SECTION  2013    COLPOSCOPY         Birth information:  YOB: 2020   Time of birth: 10:47 AM   Sex: male   Delivery type: , Low Transverse   Birth Weight: 4309 g (9 lb 8 oz)   Percent of Weight Change: -8%     Gestational Age: 44w2d   [unfilled]    Assessment     Breast and nipple assessment: normal assessment    [place] Assessment: sleepy            Feeding recommendations:  breast feed on demand    Merrick White RN 2018 9:43 AM

## 2018-01-29 NOTE — PROGRESS NOTES
Progress Note - OB/GYN   Renee Graves 27 y o  female MRN: [id number]  Unit/Bed#: -01 Encounter: [id number]    Assessment:  POD#3 s/p Repeat low transverse  section, stable    Plan:  1  Continue routine postoperative care  2  Encourage ambulation  3  Encourage breastfeeding  4  Pain control as needed    Disposition: stable, anticipate discharge today    Subjective/Objective   Chief Complaint:     POD#3 s/p Repeat low transverse  section    Subjective:     Pain: no  Tolerating PO: yes  Voiding: yes  Flatus: yes  BM: yes  Ambulating: yes  Breastfeeding: Breastfeeding  Chest pain: no  Shortness of breath: no  Leg pain: no  Lochia: minimal    Objective:     Vitals:  Vitals:    18 0445 18 0856 18 1649 18 0029   BP: 144/75 157/72 146/75 140/69   BP Location: Left arm  Left arm Right arm   Pulse: 88 68 78 71   Resp:    Temp: 98 2 °F (36 8 °C) 97 6 °F (36 4 °C) 98 3 °F (36 8 °C) 97 7 °F (36 5 °C)   TempSrc: Oral Oral Oral Oral   SpO2:       Weight:       Height:           Physical Exam:     Physical Exam   Constitutional: She is oriented to person, place, and time  She appears well-developed and well-nourished  Cardiovascular: Normal rate, regular rhythm and normal heart sounds  Pulmonary/Chest: Effort normal and breath sounds normal    Abdominal: Soft  Bowel sounds are normal    Neurological: She is alert and oriented to person, place, and time  Uterine fundus firm and non-tender, -1 cm below the umbilicus       Lab, Imaging and other studies: I have personally reviewed pertinent reports        Lab Results   Component Value Date    WBC 12 93 (H) 2018    HGB 8 5 (L) 2018    HCT 25 5 (L) 2018    MCV 87 2018     2018               Judi Cerda DO  18

## 2018-01-29 NOTE — PLAN OF CARE
Meghan#2RN lactation educator reviewed dc breastfeeding plan&dc instructions & questions answered  Pt has resourced of [id number] has lactaitonin their office also  couplet nurse reviewed paper avs dc instructions with pt & questions answered

## 2018-02-02 ENCOUNTER — ROUTINE PRENATAL (OUTPATIENT)
Dept: OBGYN CLINIC | Facility: CLINIC | Age: 33
End: 2018-02-02

## 2018-02-02 VITALS — BODY MASS INDEX: 32.62 KG/M2 | DIASTOLIC BLOOD PRESSURE: 82 MMHG | WEIGHT: 196 LBS | SYSTOLIC BLOOD PRESSURE: 124 MMHG

## 2018-02-02 DIAGNOSIS — Z98.891 HISTORY OF PRIMARY CESAREAN SECTION: ICD-10-CM

## 2018-02-02 PROBLEM — O36.0931: Status: RESOLVED | Noted: 2017-11-10 | Resolved: 2018-02-02

## 2018-02-02 PROBLEM — O16.3 HIGH BLOOD PRESSURE AFFECTING PREGNANCY IN THIRD TRIMESTER, ANTEPARTUM: Status: RESOLVED | Noted: 2017-12-28 | Resolved: 2018-02-02

## 2018-02-02 PROCEDURE — 99024 POSTOP FOLLOW-UP VISIT: CPT | Performed by: OBSTETRICS & GYNECOLOGY

## 2018-02-02 NOTE — PROGRESS NOTES
Here for postop visit / staple removal - doing well   Bowels and bladder normal  Breastfeeding well  Increased activity slowly

## 2018-02-06 ENCOUNTER — TELEPHONE (OUTPATIENT)
Dept: OBGYN CLINIC | Facility: CLINIC | Age: 33
End: 2018-02-06

## 2018-02-06 LAB — PLACENTA IN STORAGE: NORMAL

## 2018-02-08 ENCOUNTER — TRANSITIONAL CARE MANAGEMENT (OUTPATIENT)
Dept: FAMILY MEDICINE CLINIC | Facility: CLINIC | Age: 33
End: 2018-02-08

## 2018-02-22 ENCOUNTER — POSTPARTUM VISIT (OUTPATIENT)
Dept: OBGYN CLINIC | Facility: CLINIC | Age: 33
End: 2018-02-22

## 2018-02-22 VITALS — SYSTOLIC BLOOD PRESSURE: 118 MMHG | WEIGHT: 185 LBS | DIASTOLIC BLOOD PRESSURE: 72 MMHG | BODY MASS INDEX: 30.79 KG/M2

## 2018-02-22 PROCEDURE — 99024 POSTOP FOLLOW-UP VISIT: CPT | Performed by: OBSTETRICS & GYNECOLOGY

## 2018-02-22 NOTE — PROGRESS NOTES
Assessment/Plan:    Normal postoperative/postpartum exam  Return to normal activity  Using barrier methods for contraception at this time but has been a be planning vasectomy  Return to office in 3-4 months for annual exam          Problem List Items Addressed This Visit     Postpartum care following  delivery - Primary            Subjective:      Patient ID: Tomas Pineda is a 27 y o  female  HPI    The following portions of the patient's history were reviewed and updated as appropriate:   She  has a past medical history of Abnormal Pap smear of cervix; Anti-E isoimmunization affecting pregnancy, antepartum; Anxiety; Breast disorder; HPV (human papilloma virus) infection; Milia of eyelid; Seasonal allergies; and Varicella  She   Patient Active Problem List    Diagnosis Date Noted    Postpartum care following  delivery 2018    Cervical dysplasia 2016     She  has a past surgical history that includes Cervical biopsy (N/A, 2016); Cervical biopsy w/ loop electrode excision (2016);  section (); Appendectomy (2005); Colposcopy; and pr  delivery only (N/A, 2018)  Her family history includes Cancer in her mother; Depression in her maternal grandmother and mother; Diabetes in her father; Diabetes type II in her family; Early death in her father; Hearing loss in her mother; Heart disease in her father; Hyperlipidemia in her family; Hypertension in her father; Stroke in her father  She  reports that she has never smoked  She has never used smokeless tobacco  She reports that she does not drink alcohol or use drugs    Current Outpatient Prescriptions   Medication Sig Dispense Refill    Prenatal Vit-Fe Fumarate-FA (PRENATAL VITAMIN PO) Take 1 tablet by mouth daily      acetaminophen (TYLENOL) 325 mg tablet Take 2 tablets (650 mg total) by mouth every 4 (four) hours as needed for mild pain, moderate pain or headaches 30 tablet 0    docusate sodium (COLACE) 100 mg capsule Take 1 capsule (100 mg total) by mouth 2 (two) times a day as needed for constipation 10 capsule 0    ibuprofen (MOTRIN) 600 mg tablet Take 1 tablet (600 mg total) by mouth every 6 (six) hours as needed for mild pain 30 tablet 0     No current facility-administered medications for this visit  Current Outpatient Prescriptions on File Prior to Visit   Medication Sig    Prenatal Vit-Fe Fumarate-FA (PRENATAL VITAMIN PO) Take 1 tablet by mouth daily    acetaminophen (TYLENOL) 325 mg tablet Take 2 tablets (650 mg total) by mouth every 4 (four) hours as needed for mild pain, moderate pain or headaches    docusate sodium (COLACE) 100 mg capsule Take 1 capsule (100 mg total) by mouth 2 (two) times a day as needed for constipation    ibuprofen (MOTRIN) 600 mg tablet Take 1 tablet (600 mg total) by mouth every 6 (six) hours as needed for mild pain     No current facility-administered medications on file prior to visit  She has No Known Allergies       Review of Systems   Constitutional: Negative for fatigue, fever and unexpected weight change  HENT: Negative for dental problem, mouth sores, nosebleeds, rhinorrhea, sinus pain, sinus pressure and sore throat  Eyes: Negative for pain, discharge and visual disturbance  Respiratory: Negative for cough, chest tightness, shortness of breath and wheezing  Cardiovascular: Negative for chest pain, palpitations and leg swelling  Gastrointestinal: Negative for blood in stool, constipation, diarrhea, nausea and vomiting  Endocrine: Negative for polydipsia  Genitourinary: Negative for difficulty urinating, dyspareunia, dysuria, menstrual problem, pelvic pain, urgency, vaginal discharge and vaginal pain  Musculoskeletal: Negative for arthralgias, back pain and joint swelling  Allergic/Immunologic: Negative for environmental allergies  Neurological: Positive for headaches  Negative for seizures and light-headedness     Hematological: Does not bruise/bleed easily  Psychiatric/Behavioral: Negative for sleep disturbance  The patient is not nervous/anxious  Objective: There were no vitals taken for this visit  Physical Exam   Constitutional: She is oriented to person, place, and time  She appears well-developed and well-nourished  Abdominal: Soft  She exhibits no distension and no mass  There is no tenderness  There is no rebound and no guarding  Pfannenstiel incision well-healed   Genitourinary:   Genitourinary Comments: Perineum intact  Cervix closed  Uterus well involuted, mobile and nontender   Neurological: She is alert and oriented to person, place, and time  Psychiatric: She has a normal mood and affect  Her behavior is normal  Judgment and thought content normal    Negative postpartum depression-postpartum depression score equal 5   Vitals reviewed

## 2018-03-07 NOTE — PROGRESS NOTES
Education  Baby & Me Education 1st Trimester:   First Trimester Education provided:   benefits of breastfeeding, importance of exclusive breastfeeding, early initiation of breastfeeding, exclusive breastfeeding for the first 6 months and Pregnancy Essentials Reference Guide given   The patient is planning on breastfeeding     Prenatal education provided by: Jonathan Moreno PA-C      Signatures   Electronically signed by : FRANKI Borrero; Jun 21 2017  7:07PM EST                       (Author)

## 2018-04-09 ENCOUNTER — OFFICE VISIT (OUTPATIENT)
Dept: FAMILY MEDICINE CLINIC | Facility: CLINIC | Age: 33
End: 2018-04-09
Payer: COMMERCIAL

## 2018-04-09 VITALS
HEIGHT: 65 IN | WEIGHT: 191 LBS | DIASTOLIC BLOOD PRESSURE: 64 MMHG | TEMPERATURE: 98.2 F | RESPIRATION RATE: 16 BRPM | BODY MASS INDEX: 31.82 KG/M2 | HEART RATE: 80 BPM | SYSTOLIC BLOOD PRESSURE: 110 MMHG

## 2018-04-09 DIAGNOSIS — R10.13 EPIGASTRIC PAIN: Primary | ICD-10-CM

## 2018-04-09 DIAGNOSIS — R00.2 PALPITATIONS: ICD-10-CM

## 2018-04-09 DIAGNOSIS — E07.89 THYROID FULLNESS: ICD-10-CM

## 2018-04-09 PROCEDURE — 99213 OFFICE O/P EST LOW 20 MIN: CPT | Performed by: INTERNAL MEDICINE

## 2018-04-09 RX ORDER — FAMOTIDINE 40 MG/1
40 TABLET, FILM COATED ORAL DAILY
Qty: 30 TABLET | Refills: 0 | Status: SHIPPED | OUTPATIENT
Start: 2018-04-09 | End: 2019-06-05

## 2018-04-09 NOTE — PATIENT INSTRUCTIONS
Avoid coffee and motrin  Add famotadine-  Take it a few hours away fromb reast feeding  Obtain labs  Us of thyroid  If labs and us normal- and still with palpitations proceed with echo and holter  Follow up 6 weeks

## 2018-04-09 NOTE — PROGRESS NOTES
ASSESSMENT and PLAN:  Terri Taylor is a 27 y o  female with:   Problem List Items Addressed This Visit     Palpitations    Relevant Orders    CBC and differential    Iron    TSH, 3rd generation    T4, free    Holter monitor - 24 hour    Echo complete with contrast if indicated    Thyroid fullness    Relevant Orders    TSH, 3rd generation    T4, free    US thyroid    Epigastric pain - Primary    Relevant Medications    famotidine (PEPCID) 40 MG tablet          SUBJECTIVE:  Terri Taylor is a 27 y o  female who presents today with a chief complaint of Heartburn    Patient here for discomfort in epigastric area- comes and goes; No correlation with food; She is s/p delivery 1/26/18 healthy baby boy; she had some gerd during pregnancy  She has not had a lot of belchign since delivery; Has discomfort in center of abdomen; Lasts a few hours;  occ tums helps a little  occ she has palpitations- sometimes assoc with ab pain, sometimes not;  palpit can last an hour or so- then resolve,  No light headedness; no dizziness with it;'    Pt is currently breast feeding  She uses motrin on occasion      Review of Systems   Constitutional: Negative  HENT: Negative  Eyes: Negative  Respiratory: Negative  Cardiovascular: Positive for palpitations (occ palpitations with the ab discomfort; sometimes not assoc with ab pain)  Negative for chest pain and leg swelling  Gastrointestinal: Positive for abdominal pain (mid epigastric tenderness )  Negative for anal bleeding, blood in stool, constipation and diarrhea  Endocrine: Negative  Genitourinary: Negative  Musculoskeletal: Negative  Allergic/Immunologic: Negative  Neurological: Negative  Hematological: Negative  Psychiatric/Behavioral: Negative  I have reviewed the patient's PMH, Social History, Medication List and Allergies        OBJECTIVE:  /64 (BP Location: Left arm, Patient Position: Sitting, Cuff Size: Large) Pulse 80   Temp 98 2 °F (36 8 °C)   Resp 16   Ht 5' 5" (1 651 m)   Wt 86 6 kg (191 lb)   BMI 31 78 kg/m²   Physical Exam   Constitutional: She is oriented to person, place, and time  She appears well-developed and well-nourished  No distress  HENT:   Head: Normocephalic and atraumatic  Right Ear: External ear normal    Left Ear: External ear normal    Nose: Nose normal    Mouth/Throat: Oropharynx is clear and moist    Eyes: Conjunctivae and EOM are normal  Pupils are equal, round, and reactive to light  Right eye exhibits no discharge  Left eye exhibits no discharge  No scleral icterus  Neck: Normal range of motion  Neck supple  No JVD present  No tracheal deviation present  No thyromegaly present  Cardiovascular: Normal rate, regular rhythm, normal heart sounds and intact distal pulses  Pulmonary/Chest: Effort normal and breath sounds normal    Abdominal: Soft  Bowel sounds are normal    Musculoskeletal: Normal range of motion  She exhibits no edema or tenderness  Neurological: She is alert and oriented to person, place, and time  She has normal reflexes  Skin: Skin is warm and dry  Psychiatric: She has a normal mood and affect   Her behavior is normal  Judgment and thought content normal

## 2018-04-11 ENCOUNTER — APPOINTMENT (OUTPATIENT)
Dept: LAB | Facility: CLINIC | Age: 33
End: 2018-04-11
Payer: COMMERCIAL

## 2018-04-11 DIAGNOSIS — R00.2 PALPITATIONS: ICD-10-CM

## 2018-04-11 DIAGNOSIS — E07.89 THYROID FULLNESS: ICD-10-CM

## 2018-04-11 LAB
BASOPHILS # BLD AUTO: 0.06 THOUSANDS/ΜL (ref 0–0.1)
BASOPHILS NFR BLD AUTO: 1 % (ref 0–1)
EOSINOPHIL # BLD AUTO: 0.19 THOUSAND/ΜL (ref 0–0.61)
EOSINOPHIL NFR BLD AUTO: 3 % (ref 0–6)
ERYTHROCYTE [DISTWIDTH] IN BLOOD BY AUTOMATED COUNT: 13.8 % (ref 11.6–15.1)
HCT VFR BLD AUTO: 39.5 % (ref 34.8–46.1)
HGB BLD-MCNC: 12.9 G/DL (ref 11.5–15.4)
IRON SERPL-MCNC: 71 UG/DL (ref 50–170)
LYMPHOCYTES # BLD AUTO: 1.66 THOUSANDS/ΜL (ref 0.6–4.47)
LYMPHOCYTES NFR BLD AUTO: 26 % (ref 14–44)
MCH RBC QN AUTO: 27.5 PG (ref 26.8–34.3)
MCHC RBC AUTO-ENTMCNC: 32.7 G/DL (ref 31.4–37.4)
MCV RBC AUTO: 84 FL (ref 82–98)
MONOCYTES # BLD AUTO: 0.76 THOUSAND/ΜL (ref 0.17–1.22)
MONOCYTES NFR BLD AUTO: 12 % (ref 4–12)
NEUTROPHILS # BLD AUTO: 3.8 THOUSANDS/ΜL (ref 1.85–7.62)
NEUTS SEG NFR BLD AUTO: 58 % (ref 43–75)
NRBC BLD AUTO-RTO: 0 /100 WBCS
PLATELET # BLD AUTO: 265 THOUSANDS/UL (ref 149–390)
PMV BLD AUTO: 11.1 FL (ref 8.9–12.7)
RBC # BLD AUTO: 4.69 MILLION/UL (ref 3.81–5.12)
T4 FREE SERPL-MCNC: 0.85 NG/DL (ref 0.76–1.46)
TSH SERPL DL<=0.05 MIU/L-ACNC: 1.64 UIU/ML (ref 0.36–3.74)
WBC # BLD AUTO: 6.48 THOUSAND/UL (ref 4.31–10.16)

## 2018-04-11 PROCEDURE — 36415 COLL VENOUS BLD VENIPUNCTURE: CPT

## 2018-04-11 PROCEDURE — 84443 ASSAY THYROID STIM HORMONE: CPT

## 2018-04-11 PROCEDURE — 83540 ASSAY OF IRON: CPT

## 2018-04-11 PROCEDURE — 85025 COMPLETE CBC W/AUTO DIFF WBC: CPT

## 2018-04-11 PROCEDURE — 84439 ASSAY OF FREE THYROXINE: CPT

## 2018-04-12 ENCOUNTER — HOSPITAL ENCOUNTER (OUTPATIENT)
Dept: ULTRASOUND IMAGING | Facility: HOSPITAL | Age: 33
Discharge: HOME | End: 2018-04-12
Payer: COMMERCIAL

## 2018-04-12 DIAGNOSIS — E07.89 THYROID FULLNESS: ICD-10-CM

## 2018-04-12 PROCEDURE — 76536 US EXAM OF HEAD AND NECK: CPT

## 2018-04-17 DIAGNOSIS — E07.89 THYROID FULLNESS: Primary | ICD-10-CM

## 2018-04-18 ENCOUNTER — OFFICE VISIT (OUTPATIENT)
Dept: FAMILY MEDICINE CLINIC | Facility: CLINIC | Age: 33
End: 2018-04-18
Payer: COMMERCIAL

## 2018-04-18 VITALS
RESPIRATION RATE: 16 BRPM | BODY MASS INDEX: 32.12 KG/M2 | DIASTOLIC BLOOD PRESSURE: 82 MMHG | TEMPERATURE: 98.3 F | SYSTOLIC BLOOD PRESSURE: 120 MMHG | HEART RATE: 72 BPM | WEIGHT: 193 LBS

## 2018-04-18 DIAGNOSIS — J02.9 PHARYNGITIS, UNSPECIFIED ETIOLOGY: Primary | ICD-10-CM

## 2018-04-18 LAB — S PYO AG THROAT QL: NEGATIVE

## 2018-04-18 PROCEDURE — 99213 OFFICE O/P EST LOW 20 MIN: CPT | Performed by: INTERNAL MEDICINE

## 2018-04-18 PROCEDURE — 87880 STREP A ASSAY W/OPTIC: CPT | Performed by: INTERNAL MEDICINE

## 2018-04-18 RX ORDER — AZITHROMYCIN 250 MG/1
TABLET, FILM COATED ORAL
Qty: 6 TABLET | Refills: 0 | Status: SHIPPED | OUTPATIENT
Start: 2018-04-18 | End: 2018-04-22

## 2018-04-18 NOTE — PROGRESS NOTES
ASSESSMENT and PLAN:  Jamaal Panchal is a 27 y o  female with:   Problem List Items Addressed This Visit     Pharyngitis - Primary    Relevant Medications    azithromycin (ZITHROMAX) 250 mg tablet    Other Relevant Orders    POCT rapid strepA (Completed)          SUBJECTIVE:  Jamaal Panchal is a 27 y o  female who presents today with a chief complaint of Sore Throat    Sore throat- getting worse; had some white patches on throat; no fever;  Feeling poorly last few days;    Strep was negative; she has been taking claritin; She has not taken flonase        Review of Systems   Constitutional: Negative  HENT: Positive for congestion, sinus pain and sore throat  Negative for ear pain, facial swelling, hearing loss, mouth sores, tinnitus, trouble swallowing and voice change  Eyes: Negative  Respiratory: Negative  Cardiovascular: Negative  Gastrointestinal: Negative  Endocrine: Negative  Genitourinary: Negative  Musculoskeletal: Negative  Allergic/Immunologic: Negative  Neurological: Negative  Hematological: Negative  Psychiatric/Behavioral: Negative  I have reviewed the patient's PMH, Social History, Medication List and Allergies  OBJECTIVE:  /82 (BP Location: Left arm, Patient Position: Sitting, Cuff Size: Adult)   Pulse 72   Temp 98 3 °F (36 8 °C) (Tympanic)   Resp 16   Wt 87 5 kg (193 lb)   BMI 32 12 kg/m²   Physical Exam   Constitutional: She is oriented to person, place, and time  She appears well-developed and well-nourished  HENT:   Head: Normocephalic and atraumatic  Right Ear: External ear normal    Left Ear: External ear normal    Mouth/Throat: Oropharyngeal exudate (some white exudate on left tonsil;  ) present  Left tm dull; right with hypervascularity   Eyes: Conjunctivae and EOM are normal  Pupils are equal, round, and reactive to light  Right eye exhibits no discharge  Left eye exhibits no discharge     Neck: Normal range of motion  Neck supple  No JVD present  No tracheal deviation present  No thyromegaly present  Cardiovascular: Normal rate, regular rhythm, normal heart sounds and intact distal pulses  Pulmonary/Chest: Effort normal and breath sounds normal  No stridor  Musculoskeletal: Normal range of motion  She exhibits no edema or deformity  Lymphadenopathy:     She has cervical adenopathy  Neurological: She is alert and oriented to person, place, and time  She has normal reflexes  Skin: Skin is warm and dry  Psychiatric: She has a normal mood and affect  Her behavior is normal    Nursing note and vitals reviewed

## 2018-05-25 ENCOUNTER — ANNUAL EXAM (OUTPATIENT)
Dept: OBGYN CLINIC | Facility: CLINIC | Age: 33
End: 2018-05-25
Payer: COMMERCIAL

## 2018-05-25 VITALS
SYSTOLIC BLOOD PRESSURE: 122 MMHG | WEIGHT: 192 LBS | DIASTOLIC BLOOD PRESSURE: 80 MMHG | BODY MASS INDEX: 31.99 KG/M2 | HEIGHT: 65 IN

## 2018-05-25 DIAGNOSIS — Z12.4 PAP SMEAR FOR CERVICAL CANCER SCREENING: ICD-10-CM

## 2018-05-25 DIAGNOSIS — Z01.419 ENCOUNTER FOR ANNUAL ROUTINE GYNECOLOGICAL EXAMINATION: Primary | ICD-10-CM

## 2018-05-25 PROCEDURE — S0612 ANNUAL GYNECOLOGICAL EXAMINA: HCPCS | Performed by: OBSTETRICS & GYNECOLOGY

## 2018-05-25 NOTE — PROGRESS NOTES
Assessment/Plan:    Normal gyn APE       Problem List Items Addressed This Visit     Encounter for annual routine gynecological examination - Primary      Other Visit Diagnoses     Pap smear for cervical cancer screening                Subjective:      Patient ID: Vaughn Fink is a 27 y o  female  Here for annual gyn exam - overall well - menses were  regular and not too heavy or crampy - currrently still breastfeeding and menses have no returned  - currently using condoms and  has appointment for vas consultation - bowels and bladder normal - no recent medical issues        The following portions of the patient's history were reviewed and updated as appropriate:   She  has a past medical history of Abnormal Pap smear of cervix; Anti-E isoimmunization affecting pregnancy, antepartum; Anxiety; Breast disorder; Depression; HPV (human papilloma virus) infection; Milia of eyelid; Seasonal allergies; and Varicella  She   Patient Active Problem List    Diagnosis Date Noted    Encounter for annual routine gynecological examination 2018    Pharyngitis 2018    Palpitations 2018    Thyroid fullness 2018    Epigastric pain 2018     She  has a past surgical history that includes Cervical biopsy (N/A, 2016); Cervical biopsy w/ loop electrode excision (2016);  section (); Appendectomy (2005); Colposcopy; and pr  delivery only (N/A, 2018)  Her family history includes Cancer in her mother; Depression in her maternal grandmother and mother; Diabetes in her father; Diabetes type II in her family; Early death in her father; Hearing loss in her mother; Heart disease in her father; Hyperlipidemia in her family; Hypertension in her father; Stroke in her father  She  reports that she has never smoked  She has never used smokeless tobacco  She reports that she drinks alcohol  She reports that she does not use drugs    Current Outpatient Prescriptions Medication Sig Dispense Refill    acetaminophen (TYLENOL) 325 mg tablet Take 2 tablets (650 mg total) by mouth every 4 (four) hours as needed for mild pain, moderate pain or headaches 30 tablet 0    famotidine (PEPCID) 40 MG tablet Take 1 tablet (40 mg total) by mouth daily 30 tablet 0    Prenatal Vit-Fe Fumarate-FA (PRENATAL VITAMIN PO) Take 1 tablet by mouth daily       No current facility-administered medications for this visit  Current Outpatient Prescriptions on File Prior to Visit   Medication Sig    acetaminophen (TYLENOL) 325 mg tablet Take 2 tablets (650 mg total) by mouth every 4 (four) hours as needed for mild pain, moderate pain or headaches    famotidine (PEPCID) 40 MG tablet Take 1 tablet (40 mg total) by mouth daily    Prenatal Vit-Fe Fumarate-FA (PRENATAL VITAMIN PO) Take 1 tablet by mouth daily     No current facility-administered medications on file prior to visit  She has No Known Allergies       Review of Systems   Constitutional: Positive for fatigue  Negative for chills, fever and unexpected weight change  HENT: Negative for dental problem, sinus pain and sinus pressure  Eyes: Negative for visual disturbance  Respiratory: Negative for cough, shortness of breath and wheezing  Cardiovascular: Negative for chest pain and leg swelling  Gastrointestinal: Negative for constipation, diarrhea, nausea and vomiting  Genitourinary: Negative for menstrual problem, pelvic pain and urgency  Musculoskeletal: Negative for back pain  Allergic/Immunologic: Negative for environmental allergies  Neurological: Positive for headaches  Negative for dizziness  Psychiatric/Behavioral:        Anxiety           Objective:      LMP  (LMP Unknown)          Physical Exam   Constitutional: She is oriented to person, place, and time  She appears well-developed and well-nourished  No distress  Kelvin Carrie white female    HENT:   Head: Normocephalic and atraumatic     Neck: Normal range of motion  Neck supple  No thyromegaly present  Cardiovascular: Normal rate and regular rhythm  Pulmonary/Chest: Effort normal and breath sounds normal  No respiratory distress  She has no wheezes  She has no rales  She exhibits no tenderness  Right breast exhibits no inverted nipple, no mass, no nipple discharge, no skin change and no tenderness  Left breast exhibits no inverted nipple, no mass, no nipple discharge, no skin change and no tenderness  Breasts are symmetrical    Abdominal: Soft  She exhibits no distension and no mass  There is no tenderness  There is no rebound and no guarding  pfannenstiel incision well healed   Genitourinary:   Genitourinary Comments: Normal vulvovaginal female, cervix normal and not friable with pap, uterus anterior and normal in size, shape and mobility, no adnexal masses or tenderness noted   Neurological: She is alert and oriented to person, place, and time  Psychiatric: She has a normal mood and affect  Her behavior is normal    Vitals reviewed

## 2018-05-31 LAB — THIN PREP CVX: NORMAL

## 2018-06-10 ENCOUNTER — OFFICE VISIT (OUTPATIENT)
Dept: URGENT CARE | Facility: MEDICAL CENTER | Age: 33
End: 2018-06-10
Payer: COMMERCIAL

## 2018-06-10 VITALS
HEIGHT: 65 IN | OXYGEN SATURATION: 98 % | TEMPERATURE: 97.7 F | RESPIRATION RATE: 18 BRPM | BODY MASS INDEX: 31.29 KG/M2 | WEIGHT: 187.8 LBS | SYSTOLIC BLOOD PRESSURE: 114 MMHG | HEART RATE: 64 BPM | DIASTOLIC BLOOD PRESSURE: 80 MMHG

## 2018-06-10 DIAGNOSIS — J06.9 VIRAL URI: Primary | ICD-10-CM

## 2018-06-10 PROCEDURE — 99213 OFFICE O/P EST LOW 20 MIN: CPT | Performed by: FAMILY MEDICINE

## 2018-06-10 NOTE — PATIENT INSTRUCTIONS
2 sprays flonase daily for 1 week  Continue claritin daily  Watch for fevers or facial flushing  Follow up with your PCP for continue symptoms  Go to the ER for any distress

## 2018-06-10 NOTE — PROGRESS NOTES
[facility]        NAME: Makeda Nova is a 27 y o  female  : 1985    MRN: [id number]  DATE: Britney 10, 2018  TIME: 8:34 PM    Assessment and Plan   Viral URI [J06 9]  1  Viral URI           Patient Instructions     2 sprays flonase daily for 1 week  Continue claritin daily  Watch for fevers or facial flushing  Follow up with PCP in 3-5 days  Proceed to  ER if symptoms worsen  Chief Complaint     Chief Complaint   Patient presents with    Cold Like Symptoms         History of Present Illness         This is a 58-year-old female presenting for URI symptoms x5 days  Symptoms include cough, sinus congestion on the right side of the face, clogged ears, sneezing  She denies any fevers, abdominal pain, nausea, vomiting, diarrhea, sore throat, rashes  She has been using Flonase and Claritin daily  Review of Systems   Review of Systems   Constitutional: Negative for chills, fatigue and fever  HENT: Positive for congestion, ear pain, postnasal drip, rhinorrhea and sinus pressure  Negative for ear discharge and sore throat  Respiratory: Positive for cough  Negative for shortness of breath  Gastrointestinal: Negative for abdominal pain, diarrhea, nausea and vomiting  Musculoskeletal: Negative for myalgias  Skin: Negative for rash  Neurological: Negative for dizziness, light-headedness and headaches           Current Medications       Current Outpatient Prescriptions:     acetaminophen (TYLENOL) 325 mg tablet, Take 2 tablets (650 mg total) by mouth every 4 (four) hours as needed for mild pain, moderate pain or headaches, Disp: 30 tablet, Rfl: 0    Prenatal Vit-Fe Fumarate-FA (PRENATAL VITAMIN PO), Take 1 tablet by mouth daily, Disp: , Rfl:     famotidine (PEPCID) 40 MG tablet, Take 1 tablet (40 mg total) by mouth daily, Disp: 30 tablet, Rfl: 0    Current Allergies     Allergies as of 06/10/2018    (No Known Allergies)            The following portions of the patient's history were reviewed and updated as appropriate: allergies, current medications, past family history, past medical history, past social history, past surgical history and problem list      Past Medical History:   Diagnosis Date    Abnormal Pap smear of cervix     Anti-E isoimmunization affecting pregnancy, antepartum     Anxiety     RESOLVED - PER PT     Breast disorder     lumps    Depression     Post partum depression    HPV (human papilloma virus) infection     Milia of eyelid     Seasonal allergies     Varicella     childhood       Past Surgical History:   Procedure Laterality Date    APPENDECTOMY  2005    CERVICAL BIOPSY N/A 2016    Procedure: LEEP ;  Surgeon: Vidhya Estrella MD;  Location: [facility];  Service:     CERVICAL BIOPSY  W/ LOOP ELECTRODE EXCISION  2016     SECTION      COLPOSCOPY      NC  DELIVERY ONLY N/A 2018    Procedure:  SECTION () REPEAT;  Surgeon: Vidhya Estrella MD;  [facility];  Service: Obstetrics       Family History   Problem Relation Age of Onset    Cancer Mother     Depression Mother     Hearing loss Mother     Diabetes Father     Early death Father     Heart disease Father     Hypertension Father     Stroke Father     Depression Maternal Grandmother     Hyperlipidemia Family     Diabetes type II Family          Medications have been verified  Objective   /80   Pulse 64   Temp 97 7 °F (36 5 °C) (Temporal)   Resp 18   Ht 5' 5" (1 651 m)   Wt 85 2 kg (187 lb 12 8 oz)   LMP  (LMP Unknown)   SpO2 98%   BMI 31 25 kg/m²        Physical Exam     Physical Exam   Constitutional: She appears well-developed and well-nourished  No distress  HENT:   Head: Normocephalic and atraumatic  Right Ear: External ear and ear canal normal  No drainage or tenderness  A middle ear effusion is present  Left Ear: Tympanic membrane, external ear and ear canal normal  No drainage or tenderness     Nose: Nose normal    Mouth/Throat: Uvula is midline, oropharynx is clear and moist and mucous membranes are normal  No oropharyngeal exudate, posterior oropharyngeal edema or posterior oropharyngeal erythema  Eyes: Conjunctivae are normal  Pupils are equal, round, and reactive to light  Neck: Normal range of motion  Neck supple  Cardiovascular: Normal rate, regular rhythm and normal heart sounds  Pulmonary/Chest: Effort normal and breath sounds normal  No respiratory distress  She has no decreased breath sounds  She has no wheezes  She has no rhonchi  She has no rales  Lymphadenopathy:     She has no cervical adenopathy  Neurological: She is alert  Skin: Skin is warm and dry  She is not diaphoretic  Nursing note and vitals reviewed

## 2018-06-30 ENCOUNTER — OFFICE VISIT (OUTPATIENT)
Dept: URGENT CARE | Facility: MEDICAL CENTER | Age: 33
End: 2018-06-30
Payer: COMMERCIAL

## 2018-06-30 VITALS
BODY MASS INDEX: 30.92 KG/M2 | OXYGEN SATURATION: 99 % | RESPIRATION RATE: 18 BRPM | SYSTOLIC BLOOD PRESSURE: 118 MMHG | DIASTOLIC BLOOD PRESSURE: 84 MMHG | TEMPERATURE: 98 F | WEIGHT: 185.6 LBS | HEART RATE: 70 BPM | HEIGHT: 65 IN

## 2018-06-30 DIAGNOSIS — J02.9 SORE THROAT: ICD-10-CM

## 2018-06-30 DIAGNOSIS — R52 BODY ACHES: Primary | ICD-10-CM

## 2018-06-30 LAB — S PYO AG THROAT QL: NEGATIVE

## 2018-06-30 PROCEDURE — 87070 CULTURE OTHR SPECIMN AEROBIC: CPT | Performed by: PHYSICIAN ASSISTANT

## 2018-06-30 PROCEDURE — 99213 OFFICE O/P EST LOW 20 MIN: CPT | Performed by: PHYSICIAN ASSISTANT

## 2018-06-30 RX ORDER — IBUPROFEN 400 MG/1
TABLET ORAL EVERY 6 HOURS PRN
COMMUNITY
End: 2019-06-05

## 2018-06-30 NOTE — PATIENT INSTRUCTIONS
Rapid strep negative in office, will send for culture and call if positive  Continue tylenol, motrin, heating pad, ice packs for body aches  Watch for any localizing symptoms and follow up for this  Go to the ER for any distress

## 2018-06-30 NOTE — PROGRESS NOTES
[address] [facility]        NAME: Basilio Perales is a 27 y o  female  : 1985    MRN: [id number]  DATE: 2018  TIME: 6:30 PM    Assessment and Plan   Body aches [R52]  1  Body aches     2  Sore throat  POCT rapid strepA    Throat culture         Patient Instructions     Rapid strep negative in office, will send for culture and call if positive  Continue tylenol, motrin, heating pad, ice packs for body aches  Watch for any localizing symptoms and follow up for this  Go to the ER for any distress  Follow up with PCP in 3-5 days  Proceed to  ER if symptoms worsen  Chief Complaint     Chief Complaint   Patient presents with    Generalized Body Aches         History of Present Illness       This is a 27year old female presenting for body aches x 5 days  She states that she is achy in her back and bilateral arms and legs  She has a scratchy throat that started today and members of her family have been sick with strep recently  She denies any cough, congestion, fevers, abdominal pain, nausea, vomiting, diarrhea, rashes, tick bites  She has taken OTC pain relievers with moderate relief  Review of Systems   Review of Systems   Constitutional: Negative for chills, fatigue and fever  HENT: Positive for sore throat  Negative for congestion, postnasal drip, rhinorrhea and sinus pain  Respiratory: Negative for cough and shortness of breath  Gastrointestinal: Negative for abdominal pain, diarrhea, nausea and vomiting  Musculoskeletal: Positive for myalgias  Skin: Negative for rash  Neurological: Negative for dizziness, weakness and headaches           Current Medications       Current Outpatient Prescriptions:     acetaminophen (TYLENOL) 325 mg tablet, Take 2 tablets (650 mg total) by mouth every 4 (four) hours as needed for mild pain, moderate pain or headaches, Disp: 30 tablet, Rfl: 0    famotidine (PEPCID) 40 MG tablet, Take 1 tablet (40 mg total) by mouth daily, Disp: 30 tablet, Rfl: 0    ibuprofen (MOTRIN) 400 mg tablet, Take by mouth every 6 (six) hours as needed for mild pain, Disp: , Rfl:     Prenatal Vit-Fe Fumarate-FA (PRENATAL VITAMIN PO), Take 1 tablet by mouth daily, Disp: , Rfl:     Current Allergies     Allergies as of 2018    (No Known Allergies)            The following portions of the patient's history were reviewed and updated as appropriate: allergies, current medications, past family history, past medical history, past social history, past surgical history and problem list      Past Medical History:   Diagnosis Date    Abnormal Pap smear of cervix     Anti-E isoimmunization affecting pregnancy, antepartum     Anxiety     RESOLVED - PER PT     Breast disorder     lumps    Depression     Post partum depression    HPV (human papilloma virus) infection     Milia of eyelid     Seasonal allergies     Varicella     childhood       Past Surgical History:   Procedure Laterality Date    APPENDECTOMY  2005    CERVICAL BIOPSY N/A 2016    Procedure: LEEP ;  Surgeon: Darrion Rojas MD;  Location: [facility];  Service:     CERVICAL BIOPSY  W/ LOOP ELECTRODE EXCISION  2016     SECTION      COLPOSCOPY      AR  DELIVERY ONLY N/A 2018    Procedure:  SECTION () REPEAT;  Surgeon: Darrion Rojas MD;  Location: [facility] ;  Service: Obstetrics       Family History   Problem Relation Age of Onset    Cancer Mother     Depression Mother     Hearing loss Mother     Diabetes Father     Early death Father     Heart disease Father     Hypertension Father     Stroke Father     Depression Maternal Grandmother     Hyperlipidemia Family     Diabetes type II Family          Medications have been verified          Objective   /84   Pulse 70   Temp 98 °F (36 7 °C) (Temporal)   Resp 18   Ht 5' 5" (1 651 m)   Wt 84 2 kg (185 lb 9 6 oz)   SpO2 99%   BMI 30 89 kg/m²        Physical Exam     Physical Exam Constitutional: She appears well-developed and well-nourished  No distress  HENT:   Head: Normocephalic and atraumatic  Right Ear: Tympanic membrane, external ear and ear canal normal  No drainage or tenderness  Left Ear: Tympanic membrane, external ear and ear canal normal  No drainage or tenderness  Nose: Nose normal    Mouth/Throat: Uvula is midline and mucous membranes are normal  Posterior oropharyngeal erythema present  No oropharyngeal exudate or posterior oropharyngeal edema  Eyes: Conjunctivae are normal  Pupils are equal, round, and reactive to light  Neck: Normal range of motion  Neck supple  Cardiovascular: Normal rate, regular rhythm and normal heart sounds  Pulmonary/Chest: Effort normal and breath sounds normal  No respiratory distress  She has no decreased breath sounds  She has no wheezes  She has no rhonchi  She has no rales  Lymphadenopathy:     She has no cervical adenopathy  Neurological: She is alert  Skin: Skin is warm and dry  She is not diaphoretic  Nursing note and vitals reviewed

## 2018-07-02 LAB — BACTERIA THROAT CULT: NORMAL

## 2018-07-17 ENCOUNTER — OFFICE VISIT (OUTPATIENT)
Dept: FAMILY MEDICINE CLINIC | Facility: CLINIC | Age: 33
End: 2018-07-17
Payer: COMMERCIAL

## 2018-07-17 VITALS
BODY MASS INDEX: 30.86 KG/M2 | SYSTOLIC BLOOD PRESSURE: 104 MMHG | TEMPERATURE: 99 F | DIASTOLIC BLOOD PRESSURE: 78 MMHG | HEART RATE: 68 BPM | HEIGHT: 65 IN | WEIGHT: 185.2 LBS

## 2018-07-17 DIAGNOSIS — M62.838 CERVICAL PARASPINAL MUSCLE SPASM: Primary | ICD-10-CM

## 2018-07-17 PROCEDURE — 99213 OFFICE O/P EST LOW 20 MIN: CPT | Performed by: INTERNAL MEDICINE

## 2018-07-17 PROCEDURE — 96372 THER/PROPH/DIAG INJ SC/IM: CPT | Performed by: INTERNAL MEDICINE

## 2018-07-17 RX ORDER — PREDNISONE 10 MG/1
TABLET ORAL
Qty: 4 TABLET | Refills: 0 | Status: SHIPPED | OUTPATIENT
Start: 2018-07-17 | End: 2018-09-04 | Stop reason: ALTCHOICE

## 2018-07-17 RX ORDER — METAXALONE 800 MG/1
TABLET ORAL
Qty: 6 TABLET | Refills: 0 | Status: SHIPPED | OUTPATIENT
Start: 2018-07-17 | End: 2018-09-04 | Stop reason: ALTCHOICE

## 2018-07-17 RX ORDER — DEXAMETHASONE SODIUM PHOSPHATE 4 MG/ML
8 INJECTION, SOLUTION INTRA-ARTICULAR; INTRALESIONAL; INTRAMUSCULAR; INTRAVENOUS; SOFT TISSUE ONCE
Status: COMPLETED | OUTPATIENT
Start: 2018-07-17 | End: 2018-07-17

## 2018-07-17 RX ADMIN — DEXAMETHASONE SODIUM PHOSPHATE 8 MG: 4 INJECTION, SOLUTION INTRA-ARTICULAR; INTRALESIONAL; INTRAMUSCULAR; INTRAVENOUS; SOFT TISSUE at 12:25

## 2018-07-17 NOTE — PROGRESS NOTES
ASSESSMENT and PLAN:  Lenard Calderon is a 27 y o  female with:   Problem List Items Addressed This Visit     Cervical paraspinal muscle spasm - Primary     Patient currently breast feeding  pred now  pred for 2 days  No breastfeeding for 3 days  Add muslce relaxer at night- no breast feeing in 24 hours after taking med  Call if no better  Ice to area          Relevant Medications    metaxalone (SKELAXIN) 800 mg tablet    predniSONE 10 mg tablet    dexamethasone (DECADRON) injection 8 mg (Completed)          SUBJECTIVE:  Lenard Calderon is a 27 y o  female who presents today with a chief complaint of Neck Pain (neck pain and stiffness x 3 days)    Patient here with stiff neck and neck pain since Sunday; Last night it got worse; she started to get a ehadache from the neck pain  Review of Systems   Constitutional: Negative  HENT: Negative  Eyes: Negative  Respiratory: Negative  Cardiovascular: Negative  Gastrointestinal: Negative  Endocrine: Negative  Genitourinary: Negative  Musculoskeletal: Positive for arthralgias (neck pain  on right side;  )  Negative for back pain, gait problem and joint swelling  Allergic/Immunologic: Negative  Neurological: Negative  Hematological: Negative  Psychiatric/Behavioral: Negative  I have reviewed the patient's PMH, Social History, Medication List and Allergies  OBJECTIVE:  /78 (BP Location: Left arm, Patient Position: Sitting, Cuff Size: Large)   Pulse 68   Temp 99 °F (37 2 °C)   Ht 5' 5" (1 651 m)   Wt 84 kg (185 lb 3 2 oz)   LMP 07/01/2018 (Approximate)   Breastfeeding? Yes   BMI 30 82 kg/m²   Physical Exam   Constitutional: She is oriented to person, place, and time  She appears well-developed and well-nourished  HENT:   Head: Normocephalic  Eyes: Conjunctivae are normal  Pupils are equal, round, and reactive to light  Neck: No JVD present   Spinous process tenderness (right c456 painful to palpation;  ) and muscular tenderness present  No neck rigidity  Decreased range of motion present  No tracheal deviation, no edema and no erythema present  No Kernig's sign noted  No thyromegaly present  Spasm right c spine and rithg trapezius;  painw with  Rotation to left;     Cardiovascular: Normal rate and normal heart sounds  Pulmonary/Chest: Breath sounds normal    Musculoskeletal: She exhibits tenderness  Neurological: She is alert and oriented to person, place, and time  Nursing note and vitals reviewed

## 2018-07-17 NOTE — PATIENT INSTRUCTIONS
Steroid shot today  Muscle relaxer up to twice a day  No breast feeding for 24 hours after last dose

## 2018-07-17 NOTE — ASSESSMENT & PLAN NOTE
Patient currently breast feeding  pred now  pred for 2 days  No breastfeeding for 3 days  Add muslce relaxer at night- no breast feeing in 24 hours after taking med  Call if no better  Ice to area

## 2018-08-13 ENCOUNTER — TELEPHONE (OUTPATIENT)
Dept: OBGYN CLINIC | Facility: CLINIC | Age: 33
End: 2018-08-13

## 2018-08-13 NOTE — TELEPHONE ENCOUNTER
Pt not doing well, not feeling like herself  Does not currently feel like a harm to herself or others right now, feels that she can wait until apt tomorrow   Advised to call back immediately if that changes, otherwise is scheduled for tomorrow afternoon

## 2018-08-14 ENCOUNTER — HOSPITAL ENCOUNTER (OUTPATIENT)
Dept: NON INVASIVE DIAGNOSTICS | Facility: HOSPITAL | Age: 33
Discharge: HOME | End: 2018-08-14
Payer: COMMERCIAL

## 2018-08-14 ENCOUNTER — OFFICE VISIT (OUTPATIENT)
Dept: OBGYN CLINIC | Facility: CLINIC | Age: 33
End: 2018-08-14
Payer: COMMERCIAL

## 2018-08-14 VITALS
DIASTOLIC BLOOD PRESSURE: 84 MMHG | SYSTOLIC BLOOD PRESSURE: 124 MMHG | HEIGHT: 65 IN | BODY MASS INDEX: 30.99 KG/M2 | WEIGHT: 186 LBS

## 2018-08-14 DIAGNOSIS — R00.2 PALPITATIONS: ICD-10-CM

## 2018-08-14 PROCEDURE — 93306 TTE W/DOPPLER COMPLETE: CPT | Performed by: INTERNAL MEDICINE

## 2018-08-14 PROCEDURE — 93225 XTRNL ECG REC<48 HRS REC: CPT

## 2018-08-14 PROCEDURE — 99213 OFFICE O/P EST LOW 20 MIN: CPT | Performed by: NURSE PRACTITIONER

## 2018-08-14 PROCEDURE — 93306 TTE W/DOPPLER COMPLETE: CPT

## 2018-08-14 PROCEDURE — 93226 XTRNL ECG REC<48 HR SCAN A/R: CPT

## 2018-08-14 RX ORDER — SERTRALINE HYDROCHLORIDE 25 MG/1
50 TABLET, FILM COATED ORAL DAILY
Qty: 30 TABLET | Refills: 1 | Status: SHIPPED | OUTPATIENT
Start: 2018-08-14 | End: 2018-09-13 | Stop reason: SDUPTHER

## 2018-08-15 ENCOUNTER — TELEPHONE (OUTPATIENT)
Dept: FAMILY MEDICINE CLINIC | Facility: CLINIC | Age: 33
End: 2018-08-15

## 2018-08-15 NOTE — TELEPHONE ENCOUNTER
Spoke with patient, gave message  ----- Message from Alvah Service, DO sent at 8/15/2018 12:40 PM EDT -----  Please call the patient regarding her abnormal result  call pt- echo is normal

## 2018-08-15 NOTE — ASSESSMENT & PLAN NOTE
Postpartum depression reviewed in detail with patient  Explained may last up to a year  Best treatment to help postpartum depression is a combination of therapy and medication  Rx for Zoloft 25mg daily x 2 weeks and if still upset increase dose to 50mg daily  Referral for  for postpartum depression placed  Reviewed pp support group at [facility] and me center flyer given  Advised any thoughts of harming self, children or others to report to ER    RTO 4 weeks to assess postpartum depression

## 2018-08-15 NOTE — PROGRESS NOTES
Assessment/Plan:    Postpartum depression  Postpartum depression reviewed in detail with patient  Explained may last up to a year  Best treatment to help postpartum depression is a combination of therapy and medication  Rx for Zoloft 25mg daily x 2 weeks and if still upset increase dose to 50mg daily  Referral for  for postpartum depression placed  Reviewed pp support group at [facility] and me center flyer given  Advised any thoughts of harming self, children or others to report to ER    RTO 4 weeks to assess postpartum depression  Diagnoses and all orders for this visit:    Postpartum depression  -     sertraline (ZOLOFT) 25 mg tablet; Take 2 tablets (50 mg total) by mouth daily for 60 days          Subjective:      Patient ID: Phil Ratliff is a 27 y o  female  Pt is 6 months postpartum with complaints of "postpartum depression"  Pt is a  who delivered a male via  on 18  Infant course so far uncomplicated  Her postpartum course uncomplicated  Pt EPDS score 3 weeks postpartum was 9  Pt states since delivery she has noticed herself more anxious and depressed  Pt states she worries constantly about her children, the housework, everything  Pt finds herself anxious and worrying for no good reason  Since delivery of son her older daughter was diagnosed with sensory processing disorder and ADHD in March  Has been going to many physicians and receiving much care to help  Daughter has PT, OT, speech therapy   works 10 hours a day and works 1-2 hours away  Initially went back to work full time but has since cut back to part time  All of the demands with daughter and son became difficult to manage while working full time  Children are in  3 days a week while she works  Pt states she notices herself less anxious at work as mind is busy and does not have as much time to worry     Pt also complaining of being short tempered then upset with herself for being short tempered  She also has feelings of depression  Feels overwhelmed but all the demands  Denies it affecting sleep or concentration  Has noticed palpitations which began in pregnancy but have not resolved  Currently wearing halter monitor to assess palpitations  Thinks may be due to anxiety  Denies any thoughts of harming self, children, or anyone else  Denies hearing voices or seeing people others are not aware of  When looking back pt thinks she may have had pp depression with daughter but was undiagnosed  Was not going to come to this appointment but  encouraged her to come after being home with her yesterday and realizing she was not herself  Has been speaking to a close friend who is also experiencing postpartum depression  EPDS: 12      The following portions of the patient's history were reviewed and updated as appropriate: allergies, current medications, past family history, past medical history, past social history, past surgical history and problem list     Review of Systems   Psychiatric/Behavioral:        Anxiety, depression, overwhelmed     All other systems reviewed and are negative  Objective:  /84 (BP Location: Left arm, Patient Position: Sitting, Cuff Size: Standard)   Ht 5' 5" (1 651 m)   Wt 84 4 kg (186 lb)   BMI 30 95 kg/m²       Physical Exam   Constitutional: She is oriented to person, place, and time  She appears well-developed and well-nourished  HENT:   Head: Normocephalic  Cardiovascular: Normal rate, regular rhythm and normal heart sounds  Pulmonary/Chest: Effort normal and breath sounds normal    Abdominal: Soft  Bowel sounds are normal    Musculoskeletal: Normal range of motion  Neurological: She is alert and oriented to person, place, and time  Skin: Skin is warm and dry  Psychiatric: She has a normal mood and affect   Her speech is normal and behavior is normal  Judgment and thought content normal  Cognition and memory are normal

## 2018-08-17 PROCEDURE — 93227 XTRNL ECG REC<48 HR R&I: CPT | Performed by: INTERNAL MEDICINE

## 2018-08-20 ENCOUNTER — TELEPHONE (OUTPATIENT)
Dept: FAMILY MEDICINE CLINIC | Facility: CLINIC | Age: 33
End: 2018-08-20

## 2018-08-20 NOTE — TELEPHONE ENCOUNTER
----- Message from Chika Hirsch DO sent at 8/19/2018  9:53 AM EDT -----  Please call the patient regarding her abnormal result    Call pt-  holter showed mostly normal sinus, her sx did correlate to pvc's- we typically don't treat them, but if they are worrisome, I can see her and start meds or have her see cardiology

## 2018-08-20 NOTE — TELEPHONE ENCOUNTER
Patient called and received message   She said episodes are infrequent, but she did sched appt to go over testing with you on 09/04

## 2018-09-04 ENCOUNTER — OFFICE VISIT (OUTPATIENT)
Dept: FAMILY MEDICINE CLINIC | Facility: CLINIC | Age: 33
End: 2018-09-04
Payer: COMMERCIAL

## 2018-09-04 VITALS
WEIGHT: 186.4 LBS | BODY MASS INDEX: 31.06 KG/M2 | TEMPERATURE: 98.6 F | SYSTOLIC BLOOD PRESSURE: 118 MMHG | HEIGHT: 65 IN | RESPIRATION RATE: 14 BRPM | HEART RATE: 96 BPM | DIASTOLIC BLOOD PRESSURE: 78 MMHG

## 2018-09-04 DIAGNOSIS — E06.9 THYROIDITIS: Primary | ICD-10-CM

## 2018-09-04 DIAGNOSIS — E07.89 THYROID FULLNESS: ICD-10-CM

## 2018-09-04 DIAGNOSIS — R00.2 HEART PALPITATIONS: ICD-10-CM

## 2018-09-04 PROCEDURE — 99213 OFFICE O/P EST LOW 20 MIN: CPT | Performed by: INTERNAL MEDICINE

## 2018-09-04 PROCEDURE — 1036F TOBACCO NON-USER: CPT | Performed by: INTERNAL MEDICINE

## 2018-09-04 NOTE — PROGRESS NOTES
ASSESSMENT and PLAN:  Paulo Solis is a 27 y o  female with:   Problem List Items Addressed This Visit     Heart palpitations     Increase hydration  Decrease caffeine  Continue with zoloft for anxiety  Follow up  Consider checking tsh and t4 in 3-4 months         Thyroid fullness      Other Visit Diagnoses     Thyroiditis    -  Primary    Relevant Orders    TSH, 3rd generation    T4, free          SUBJECTIVE:  Paulo Solis is a 27 y o  female who presents today with a chief complaint of Follow-up (follow up to cardiac testing)    Patient here to follow up echo and holter; she has had palpitations; echo is normal and holter showed some pvc's/  She is caring for a y oung child  She has poor sleep on occasion  She is currently breast feeding  Review of Systems   Constitutional: Negative  HENT: Negative  Eyes: Negative  Respiratory: Negative  Cardiovascular: Positive for palpitations  Gastrointestinal: Negative  Genitourinary: Negative  Musculoskeletal: Negative  Allergic/Immunologic: Negative  Neurological: Negative  Hematological: Negative  Psychiatric/Behavioral: Negative  I have reviewed the patient's PMH, Social History, Medication List and Allergies  OBJECTIVE:  /78 (BP Location: Left arm, Patient Position: Sitting, Cuff Size: Large)   Pulse 96   Temp 98 6 °F (37 °C)   Resp 14   Ht 5' 5" (1 651 m)   Wt 84 6 kg (186 lb 6 4 oz)   LMP 08/20/2018 (Approximate)   Breastfeeding? Yes   BMI 31 02 kg/m²   Physical Exam   Constitutional: She is oriented to person, place, and time  She appears well-developed and well-nourished  HENT:   Head: Normocephalic and atraumatic  Right Ear: External ear normal    Left Ear: External ear normal    Nose: Nose normal    Mouth/Throat: Oropharynx is clear and moist    Eyes: Conjunctivae and EOM are normal  Pupils are equal, round, and reactive to light  No scleral icterus     Neck: Normal range of motion  Neck supple  No JVD present  No tracheal deviation present  Thyromegaly present  Cardiovascular: Normal rate, regular rhythm and normal heart sounds  Pulmonary/Chest: Effort normal and breath sounds normal  No respiratory distress  She has no wheezes  Abdominal: Soft  Bowel sounds are normal  She exhibits no distension  There is no tenderness  Musculoskeletal: Normal range of motion  She exhibits no edema or tenderness  Neurological: She is alert and oriented to person, place, and time  No cranial nerve deficit  Coordination normal    Skin: Skin is warm and dry  Psychiatric: She has a normal mood and affect  Her behavior is normal  Judgment and thought content normal    Nursing note and vitals reviewed

## 2018-09-04 NOTE — ASSESSMENT & PLAN NOTE
Increase hydration  Decrease caffeine  Continue with zoloft for anxiety  Follow up  Consider checking tsh and t4 in 3-4 months

## 2018-09-11 ENCOUNTER — TELEPHONE (OUTPATIENT)
Dept: OBGYN CLINIC | Facility: CLINIC | Age: 33
End: 2018-09-11

## 2018-09-13 ENCOUNTER — OFFICE VISIT (OUTPATIENT)
Dept: OBGYN CLINIC | Facility: CLINIC | Age: 33
End: 2018-09-13
Payer: COMMERCIAL

## 2018-09-13 VITALS
HEIGHT: 65 IN | BODY MASS INDEX: 30.82 KG/M2 | SYSTOLIC BLOOD PRESSURE: 128 MMHG | WEIGHT: 185 LBS | DIASTOLIC BLOOD PRESSURE: 76 MMHG

## 2018-09-13 PROCEDURE — 99213 OFFICE O/P EST LOW 20 MIN: CPT | Performed by: NURSE PRACTITIONER

## 2018-09-13 RX ORDER — SERTRALINE HYDROCHLORIDE 25 MG/1
50 TABLET, FILM COATED ORAL DAILY
Qty: 60 TABLET | Refills: 11 | Status: SHIPPED | OUTPATIENT
Start: 2018-09-13 | End: 2019-06-05

## 2018-09-13 NOTE — PROGRESS NOTES
Assessment/Plan:    Postpartum depression  Plan to continue on 50mg Zoloft daily  Rx sent to pharmacy  Reviewed do not abruptly stop  Wean off medicaiton  Reviewed how to wean  Reviewed ppd can last until child's first birthday  If anxiety and depression continues would advise to follow up with PCP  RTO for annual exam         Diagnoses and all orders for this visit:    Postpartum depression  -     sertraline (ZOLOFT) 25 mg tablet; Take 2 tablets (50 mg total) by mouth daily          Subjective:      Patient ID: Mansoor Trinh is a 27 y o  female  Pt presents today for follow up of postpartum depression  Pt is currently 7 months postpartum  Pt presented to office a month ago with complaints of "postpartum depression"  Was having difficulties with feelings of anxiety, depression, and being short-tempered  EPDS score: 12    Was started on 25 mg of Zoloft daily with instructions to increase to 50mg if still feeling upset after 2 weeks  Pt misunderstood and has been taking 50mg daily for the past month  Pt states she is feeling much better  States she no longer is as anxious and overwhelmed  No longer short tempered   commented on noticing a difference as well  Stated she was more like herself again  Working part-time has also helped in helping manage work/life balance  Now has a set routine down which helps decrease anxiety  Was advised to follow up with counseling as well  Pt had appointment scheduled and had to reschedule  Her rescheduled appointment was supposed to be last week but they called her to explain she would have to pay out of pocket for visit as was not covered under insurance  She was then given a number to call to schedule a counselor under her insurance plan  She has not gotten around to scheduling appointment      EPDS score today: 2          The following portions of the patient's history were reviewed and updated as appropriate: allergies, current medications, past family history, past medical history, past social history, past surgical history and problem list     Review of Systems   All other systems reviewed and are negative  Objective:  /76 (BP Location: Left arm, Patient Position: Sitting, Cuff Size: Large)   Ht 5' 5" (1 651 m)   Wt 83 9 kg (185 lb)   LMP 08/20/2018 (Approximate)   BMI 30 79 kg/m²      Physical Exam   Constitutional: She is oriented to person, place, and time  She appears well-developed and well-nourished  Cardiovascular: Normal rate, regular rhythm and normal heart sounds  Pulmonary/Chest: Effort normal and breath sounds normal    Abdominal: Soft  Bowel sounds are normal    Neurological: She is alert and oriented to person, place, and time  She has normal reflexes  Skin: Skin is warm and dry  Psychiatric: She has a normal mood and affect   Her behavior is normal  Judgment and thought content normal

## 2018-09-13 NOTE — ASSESSMENT & PLAN NOTE
Plan to continue on 50mg Zoloft daily  Rx sent to pharmacy  Reviewed do not abruptly stop  Wean off medicaiton  Reviewed how to wean  Reviewed ppd can last until child's first birthday  If anxiety and depression continues would advise to follow up with PCP     RTO for annual exam

## 2019-06-05 ENCOUNTER — ANNUAL EXAM (OUTPATIENT)
Dept: OBGYN CLINIC | Facility: CLINIC | Age: 34
End: 2019-06-05
Payer: COMMERCIAL

## 2019-06-05 VITALS
BODY MASS INDEX: 33.66 KG/M2 | HEIGHT: 65 IN | WEIGHT: 202 LBS | DIASTOLIC BLOOD PRESSURE: 88 MMHG | SYSTOLIC BLOOD PRESSURE: 124 MMHG

## 2019-06-05 DIAGNOSIS — Z01.419 ENCOUNTER FOR ANNUAL ROUTINE GYNECOLOGICAL EXAMINATION: Primary | ICD-10-CM

## 2019-06-05 PROBLEM — R35.1 NOCTURIA: Status: ACTIVE | Noted: 2019-05-28

## 2019-06-05 PROBLEM — G47.19 DAYTIME HYPERSOMNOLENCE: Status: ACTIVE | Noted: 2019-05-28

## 2019-06-05 PROBLEM — R63.5 WEIGHT GAIN: Status: ACTIVE | Noted: 2019-05-28

## 2019-06-05 PROBLEM — R53.83 FATIGUE: Status: ACTIVE | Noted: 2019-05-28

## 2019-06-05 PROBLEM — Z13.6 SCREENING FOR CARDIOVASCULAR CONDITION: Status: ACTIVE | Noted: 2018-02-02

## 2019-06-05 PROBLEM — E55.9 VITAMIN D DEFICIENCY: Status: ACTIVE | Noted: 2019-05-28

## 2019-06-05 PROBLEM — M79.672 LEFT FOOT PAIN: Status: ACTIVE | Noted: 2019-05-28

## 2019-06-05 PROCEDURE — S0612 ANNUAL GYNECOLOGICAL EXAMINA: HCPCS | Performed by: PHYSICIAN ASSISTANT

## 2019-06-07 LAB
CLINICAL INFO: NORMAL
CYTO CVX: NORMAL
DATE PREVIOUS BX: NORMAL
LMP START DATE: NORMAL
SL AMB PREV. PAP:: NORMAL
SPECIMEN SOURCE CVX/VAG CYTO: NORMAL

## 2020-05-27 ENCOUNTER — TRANSCRIBE ORDERS (OUTPATIENT)
Dept: ADMINISTRATIVE | Facility: HOSPITAL | Age: 35
End: 2020-05-27

## 2020-05-27 ENCOUNTER — APPOINTMENT (OUTPATIENT)
Dept: LAB | Facility: MEDICAL CENTER | Age: 35
End: 2020-05-27
Payer: COMMERCIAL

## 2020-05-27 DIAGNOSIS — Z11.1 SCREENING EXAMINATION FOR PULMONARY TUBERCULOSIS: ICD-10-CM

## 2020-05-27 DIAGNOSIS — Z11.1 SCREENING EXAMINATION FOR PULMONARY TUBERCULOSIS: Primary | ICD-10-CM

## 2020-05-27 PROCEDURE — 86480 TB TEST CELL IMMUN MEASURE: CPT

## 2020-05-27 PROCEDURE — 36415 COLL VENOUS BLD VENIPUNCTURE: CPT

## 2020-05-28 LAB
GAMMA INTERFERON BACKGROUND BLD IA-ACNC: 0.02 IU/ML
M TB IFN-G BLD-IMP: NEGATIVE
M TB IFN-G CD4+ BCKGRND COR BLD-ACNC: -0.01 IU/ML
M TB IFN-G CD4+ BCKGRND COR BLD-ACNC: -0.01 IU/ML
MITOGEN IGNF BCKGRD COR BLD-ACNC: 7.57 IU/ML

## 2020-06-10 ENCOUNTER — ANNUAL EXAM (OUTPATIENT)
Dept: OBGYN CLINIC | Facility: CLINIC | Age: 35
End: 2020-06-10
Payer: COMMERCIAL

## 2020-06-10 VITALS
HEIGHT: 65 IN | BODY MASS INDEX: 34.16 KG/M2 | DIASTOLIC BLOOD PRESSURE: 84 MMHG | TEMPERATURE: 97 F | SYSTOLIC BLOOD PRESSURE: 122 MMHG | WEIGHT: 205 LBS

## 2020-06-10 DIAGNOSIS — Z71.85 HPV VACCINE COUNSELING: ICD-10-CM

## 2020-06-10 DIAGNOSIS — Z01.419 ENCOUNTER FOR GYNECOLOGICAL EXAMINATION (GENERAL) (ROUTINE) WITHOUT ABNORMAL FINDINGS: Primary | ICD-10-CM

## 2020-06-10 PROCEDURE — 99395 PREV VISIT EST AGE 18-39: CPT | Performed by: PHYSICIAN ASSISTANT

## 2020-06-10 PROCEDURE — 3008F BODY MASS INDEX DOCD: CPT | Performed by: PHYSICIAN ASSISTANT

## 2020-06-10 RX ORDER — ESCITALOPRAM OXALATE 10 MG/1
TABLET ORAL
COMMUNITY
Start: 2020-03-04 | End: 2021-06-14

## 2020-12-07 ENCOUNTER — TELEPHONE (OUTPATIENT)
Dept: HEMATOLOGY ONCOLOGY | Facility: CLINIC | Age: 35
End: 2020-12-07

## 2020-12-07 ENCOUNTER — TELEPHONE (OUTPATIENT)
Dept: OBGYN CLINIC | Facility: CLINIC | Age: 35
End: 2020-12-07

## 2020-12-07 DIAGNOSIS — N63.0 BREAST LUMP: Primary | ICD-10-CM

## 2020-12-09 ENCOUNTER — TELEPHONE (OUTPATIENT)
Dept: OBGYN CLINIC | Facility: CLINIC | Age: 35
End: 2020-12-09

## 2020-12-09 ENCOUNTER — TELEPHONE (OUTPATIENT)
Dept: HEMATOLOGY ONCOLOGY | Facility: CLINIC | Age: 35
End: 2020-12-09

## 2020-12-09 DIAGNOSIS — N63.0 BREAST LUMP: Primary | ICD-10-CM

## 2020-12-22 ENCOUNTER — HOSPITAL ENCOUNTER (OUTPATIENT)
Dept: ULTRASOUND IMAGING | Facility: CLINIC | Age: 35
Discharge: HOME | End: 2020-12-22
Payer: COMMERCIAL

## 2020-12-22 ENCOUNTER — HOSPITAL ENCOUNTER (OUTPATIENT)
Dept: MAMMOGRAPHY | Facility: CLINIC | Age: 35
Discharge: HOME | End: 2020-12-22
Payer: COMMERCIAL

## 2020-12-22 DIAGNOSIS — N63.0 BREAST LUMP: ICD-10-CM

## 2020-12-22 PROCEDURE — G0279 TOMOSYNTHESIS, MAMMO: HCPCS

## 2020-12-22 PROCEDURE — 76642 ULTRASOUND BREAST LIMITED: CPT

## 2020-12-22 PROCEDURE — 77066 DX MAMMO INCL CAD BI: CPT

## 2021-04-08 DIAGNOSIS — Z23 ENCOUNTER FOR IMMUNIZATION: ICD-10-CM

## 2021-06-14 ENCOUNTER — ANNUAL EXAM (OUTPATIENT)
Dept: OBGYN CLINIC | Facility: CLINIC | Age: 36
End: 2021-06-14
Payer: COMMERCIAL

## 2021-06-14 VITALS
DIASTOLIC BLOOD PRESSURE: 76 MMHG | WEIGHT: 193 LBS | BODY MASS INDEX: 32.15 KG/M2 | SYSTOLIC BLOOD PRESSURE: 122 MMHG | HEIGHT: 65 IN

## 2021-06-14 DIAGNOSIS — Z01.419 WELL WOMAN EXAM: Primary | ICD-10-CM

## 2021-06-14 PROBLEM — M99.9 NONALLOPATHIC LESION OF THORACIC REGION: Status: ACTIVE | Noted: 2021-06-14

## 2021-06-14 PROCEDURE — 99395 PREV VISIT EST AGE 18-39: CPT | Performed by: PHYSICIAN ASSISTANT

## 2021-06-14 PROCEDURE — 87624 HPV HI-RISK TYP POOLED RSLT: CPT | Performed by: PHYSICIAN ASSISTANT

## 2021-06-14 PROCEDURE — G0145 SCR C/V CYTO,THINLAYER,RESCR: HCPCS | Performed by: PHYSICIAN ASSISTANT

## 2021-06-14 NOTE — PROGRESS NOTES
Assessment/Plan   Diagnoses and all orders for this visit:    Well woman exam  -     Liquid-based pap, screening        Discussion    All questions have been answered to her satisfaction  RTO for APE or sooner if needed      Subjective     HPI   Link Sha is a 38 y o  female who presents for annual well woman exam    Menarche - ; LMP - 19 ; Periods are reg q 25-28  days and last 7 days  Will start light then 2 days of heavier bleeding, then tapers off again  Changes pad q 2-3 hours  Does not usually use any meds to improve  No vulvar itch/burn; No vaginal itch/burn; No abn discharge or odor; No urinary sx - burning/pain/frequency/hematuria    (+) SBEs - no breast masses, asymmetry, nipple discharge or bleeding, changes in skin of breast, or breast tenderness bilaterally    No abd/pelvic pain or HAs; No menopausal symptoms: No hot flashes/night sweats, problems with intercourse, vaginal dryness; sleeping well;     Pt is sexually active in a mutually monog/ sexual relationship; No issues with intercourse; She declines std/hiv/hep testing; Feels safe at home    Current contraception: condoms   Partner had vasectomy that was unsuccessful  He is considering repeating the procedure but they are using condoms for now and will continue to do so as needed  (+) PCP for routine Bw/care; Last Pap - 19 WNL   History of abnormal Pap smear: h/o LEEP procedure 2016 paps WNL since     Review of Systems   Constitutional: Negative  Respiratory: Negative  Gastrointestinal: Negative  Endocrine: Negative  Genitourinary: Negative          The following portions of the patient's history were reviewed and updated as appropriate: allergies, current medications, past family history, past medical history, past social history, past surgical history and problem list          OB History        2    Para   2    Term   2            AB        Living   2       SAB        TAB Ectopic        Multiple   0    Live Births   2                 Past Medical History:   Diagnosis Date    Abnormal Pap smear of cervix     Anti-E isoimmunization affecting pregnancy, antepartum     Anxiety     RESOLVED - PER PT     Breast disorder     lumps    Depression     Post partum depression    HPV (human papilloma virus) infection     Milia of eyelid     Seasonal allergies     Varicella     childhood       Past Surgical History:   Procedure Laterality Date    APPENDECTOMY  2005    CERVICAL BIOPSY N/A 2016    Procedure: LEEP ;  Surgeon: Arabella Nur MD;  Location:  MAIN OR;  Service:     CERVICAL BIOPSY  W/ LOOP ELECTRODE EXCISION  2016     SECTION  2013    COLPOSCOPY      MI  DELIVERY ONLY N/A 2018    Procedure:  SECTION () REPEAT;  Surgeon: Arabella Nur MD;  Location: [facility];  Service: Obstetrics       Family History   Problem Relation Age of Onset    Cancer Mother         skin    Depression Mother     Hearing loss Mother     Diabetes Father     Early death Father     Heart disease Father    Renee Riis Hypertension Father     Stroke Father     Depression Maternal Grandmother     No Known Problems Paternal Grandmother     Hyperlipidemia Family     Diabetes type II Family     No Known Problems Daughter     No Known Problems Paternal Aunt     No Known Problems Paternal Aunt     No Known Problems Paternal Aunt     No Known Problems Paternal Aunt        Social History     Socioeconomic History    Marital status: /Civil Union     Spouse name: Not on file    Number of children: Not on file    Years of education: Not on file    Highest education level: Not on file   Occupational History    Occupation: OCCUPATIONAL THERAPIST ASSISTANT   Tobacco Use    Smoking status: Never Smoker    Smokeless tobacco: Never Used   Vaping Use    Vaping Use: Never used   Substance and Sexual Activity    Alcohol use: Yes     Comment: social    Drug use: No    Sexual activity: Yes     Partners: Male     Birth control/protection: Condom Male     Comment: pt declines hiv/std testing   Other Topics Concern    Not on file   Social History Narrative    CULTURAL BACKGROUND    FEELS SAFE AT HOME    MONOGAMOUS RELATIONSHIP    PRIMARY SPOKEN LANGUAGE IS ENGLISH    RACIAL BACKGROUND    SOCIAL        Daily caffeine use- 2 cups of coffee     Social Determinants of Health     Financial Resource Strain:     Difficulty of Paying Living Expenses:    Food Insecurity:     Worried About Running Out of Food in the Last Year:     [address] in the Last Year:    Transportation Needs:     Lack of Transportation (Medical):  Lack of Transportation (Non-Medical):    Physical Activity:     Days of Exercise per Week:     Minutes of Exercise per Session:    Stress:     Feeling of Stress :    Social Connections:     Frequency of Communication with Friends and Family:     Frequency of Social Gatherings with Friends and Family:     Attends [employer] Services:     Active Member of Clubs or Organizations:     Attends Club or Organization Meetings:     Marital Status:    Intimate Partner Violence:     Fear of Current or Ex-Partner:     Emotionally Abused:     Physically Abused:     Sexually Abused:          Current Outpatient Medications:     fluticasone (FLONASE) 50 mcg/act nasal spray, 2 sprays into each nostril daily, Disp: 1 Bottle, Rfl: 10    escitalopram (LEXAPRO) 10 mg tablet, 1 tab daily with food, Disp: , Rfl:     Allergies   Allergen Reactions    Pollen Extract Cough       Objective   Vitals:    06/14/21 0832   BP: 122/76   BP Location: Left arm   Patient Position: Sitting   Cuff Size: Standard   Weight: 87 5 kg (193 lb)   Height: 5' 5" (1 651 m)     Physical Exam  Constitutional:       Appearance: She is well-developed  HENT:      Head: Normocephalic and atraumatic  Cardiovascular:      Rate and Rhythm: Normal rate and regular rhythm     Pulmonary: Effort: Pulmonary effort is normal       Breath sounds: Normal breath sounds  Chest:      Breasts: Breasts are symmetrical          Right: No inverted nipple, mass, nipple discharge, skin change or tenderness  Left: No inverted nipple, mass, nipple discharge, skin change or tenderness  Abdominal:      General: There is no distension  Palpations: Abdomen is soft  There is no mass  Tenderness: There is no guarding or rebound  Genitourinary:     Exam position: Supine  Labia:         Right: No rash  Left: No rash  Vagina: No signs of injury and foreign body  No vaginal discharge or erythema  Cervix: No cervical motion tenderness  Adnexa:         Right: No mass  Left: No mass  Rectum: Guaiac result negative  Skin:     General: Skin is warm and dry  Neurological:      Mental Status: She is alert and oriented to person, place, and time  Patient Instructions   Pap done as no HPV done since 2017  If WNL aware no need to repeat pap until 3 years  Will plan NSAIDs for menorrhagia  Will continue condoms for [facility] for now but may consider other options in the future and will call if she desires

## 2021-06-14 NOTE — PATIENT INSTRUCTIONS
Pap done as no HPV done since 2017  If WNL aware no need to repeat pap until 3 years  Will plan NSAIDs for menorrhagia  Will continue condoms for [facility] for now but may consider other options in the future and will call if she desires

## 2021-06-17 LAB
HPV HR 12 DNA CVX QL NAA+PROBE: NEGATIVE
HPV16 DNA CVX QL NAA+PROBE: NEGATIVE
HPV18 DNA CVX QL NAA+PROBE: NEGATIVE

## 2021-06-18 LAB
LAB AP GYN PRIMARY INTERPRETATION: NORMAL
Lab: NORMAL

## 2022-06-21 NOTE — PROGRESS NOTES
Assessment/Plan   Problem List Items Addressed This Visit    None     Visit Diagnoses     Well woman exam    -  Primary    Relevant Orders    Liquid-based pap, screening    Mass of upper inner quadrant of right breast        Relevant Orders    Mammo diagnostic bilateral w 3d & cad    US breast right limited (diagnostic)          Discussion    All questions have been answered to her satisfaction  RTO for APE or sooner if needed      Subjective     HPI   Jessy Jones is a 43 y o  female who presents for annual well woman exam    LMP -5/27/22 ; Periods are slightly irreg q 23-28 days and last  days; Some months cramping is worse than others  Typically has one day of heavier bleeding changing a pad q 3 hours  Advised NSAIDs  Pt uninterested in any BC or hormonal tx to help w menses at this point  No vulvar itch/burn; No vaginal itch/burn; No abn discharge or odor; No urinary sx - burning/pain/frequency/hematuria    (+) SBEs - no breast masses, asymmetry, nipple discharge or bleeding, changes in skin of breast, or breast tenderness bilaterally  Pt had previously had right breast tissue that seemed more prominent  Had imaging and followed w Dr Carrie Moore, appeared benign, was released from his care a few years ago  No abd/pelvic pain or HAs; No menopausal symptoms: No hot flashes/night sweats, problems with intercourse, vaginal dryness; sleeping well;     Pt is sexually active in a mutually monog/ sexual relationship; No issues with intercourse; She declines sti/hiv/hep testing; Feels safe at home    Current contraception: condoms     (+) PCP for routine Bw/care; Last Pap - 6/14/21 WNL neg HPV   History of abnormal Pap smear: h/o + HPV previously, Leep done 2016-RENÉE 2     Review of Systems   Constitutional: Negative  Respiratory: Negative  Gastrointestinal: Negative  Endocrine: Negative  Genitourinary: Negative          The following portions of the patient's history were reviewed and updated as appropriate: allergies, current medications, past family history, past medical history, past social history, past surgical history and problem list          OB History        2    Para   2    Term   2            AB        Living   2       SAB        IAB        Ectopic        Multiple   0    Live Births   2                 Past Medical History:   Diagnosis Date    Abnormal Pap smear of cervix     Anti-E isoimmunization affecting pregnancy, antepartum     Anxiety     RESOLVED - PER PT     Breast disorder     lumps    Depression     Post partum depression    HPV (human papilloma virus) infection     Milia of eyelid     Seasonal allergies     Varicella     childhood       Past Surgical History:   Procedure Laterality Date    APPENDECTOMY  2005    CERVICAL BIOPSY N/A 2016    Procedure: LEEP ;  Surgeon: Jorge Toth MD;  [facility];  Service:     CERVICAL BIOPSY  W/ LOOP ELECTRODE EXCISION  2016     SECTION      COLPOSCOPY      FOOT SURGERY      AL  DELIVERY ONLY N/A 2018    Procedure:  SECTION () REPEAT;  Surgeon: Jorge Toth MD;  Location: [facility];  Service: Obstetrics       Family History   Problem Relation Age of Onset    Cancer Mother         skin    Depression Mother     Hearing loss Mother     Diabetes Father     Early death Father     Heart disease Father     Hypertension Father     Stroke Father     Depression Maternal Grandmother     No Known Problems Paternal Grandmother     Hyperlipidemia Family     Diabetes type II Family     No Known Problems Daughter     No Known Problems Paternal Aunt     No Known Problems Paternal Aunt     No Known Problems Paternal Aunt     No Known Problems Paternal Aunt        Social History     Socioeconomic History    Marital status: /Civil Union     Spouse name: Not on file    Number of children: Not on file    Years of education: Not on file    Highest education level: Not on file   Occupational History    Occupation: OCCUPATIONAL THERAPIST ASSISTANT   Tobacco Use    Smoking status: Never Smoker    Smokeless tobacco: Never Used   Vaping Use    Vaping Use: Never used   Substance and Sexual Activity    Alcohol use: Yes     Comment: social    Drug use: No    Sexual activity: Yes     Partners: Male     Birth control/protection: Condom Male     Comment: pt declines hiv/std testing   Other Topics Concern    Not on file   Social History Narrative    CULTURAL BACKGROUND    FEELS SAFE AT HOME    MONOGAMOUS RELATIONSHIP    PRIMARY SPOKEN LANGUAGE IS ENGLISH    RACIAL BACKGROUND    SOCIAL        Daily caffeine use- 2 cups of coffee     Social Determinants of Health     Financial Resource Strain: Not on file   Food Insecurity: Not on file   Transportation Needs: Not on file   Physical Activity: Not on file   Stress: Not on file   Social Connections: Not on file   Intimate Partner Violence: Not on file   Housing Stability: Not on file         Current Outpatient Medications:     fluticasone (FLONASE) 50 mcg/act nasal spray, 2 sprays into each nostril daily, Disp: 16 g, Rfl: 11    Allergies   Allergen Reactions    Other Cough    Pollen Extract Cough       Objective   Vitals:    06/22/22 0837   BP: 122/82   Weight: 89 3 kg (196 lb 12 8 oz)   Height: 5' 5" (1 651 m)     Physical Exam  Vitals reviewed  Constitutional:       Appearance: She is well-developed  HENT:      Head: Normocephalic and atraumatic  Cardiovascular:      Rate and Rhythm: Normal rate and regular rhythm  Pulmonary:      Effort: Pulmonary effort is normal       Breath sounds: Normal breath sounds  Chest:   Breasts: Breasts are symmetrical       Right: Mass present  No inverted nipple, nipple discharge, skin change, tenderness, axillary adenopathy or supraclavicular adenopathy        Left: No inverted nipple, mass, nipple discharge, skin change, tenderness, axillary adenopathy or supraclavicular adenopathy  Abdominal:      General: There is no distension  Palpations: Abdomen is soft  There is no mass  Tenderness: There is no guarding or rebound  Genitourinary:     General: Normal vulva  Exam position: Supine  Labia:         Right: No rash  Left: No rash  Vagina: No signs of injury and foreign body  No vaginal discharge or erythema  Cervix: No cervical motion tenderness  Adnexa:         Right: No mass  Left: No mass  Lymphadenopathy:      Upper Body:      Right upper body: No supraclavicular, axillary or pectoral adenopathy  Left upper body: No supraclavicular, axillary or pectoral adenopathy  Skin:     General: Skin is warm and dry  Neurological:      Mental Status: She is alert and oriented to person, place, and time  There are no Patient Instructions on file for this visit

## 2022-06-22 ENCOUNTER — ANNUAL EXAM (OUTPATIENT)
Dept: OBGYN CLINIC | Facility: CLINIC | Age: 37
End: 2022-06-22
Payer: COMMERCIAL

## 2022-06-22 VITALS
DIASTOLIC BLOOD PRESSURE: 82 MMHG | BODY MASS INDEX: 32.79 KG/M2 | SYSTOLIC BLOOD PRESSURE: 122 MMHG | WEIGHT: 196.8 LBS | HEIGHT: 65 IN

## 2022-06-22 DIAGNOSIS — N63.12 MASS OF UPPER INNER QUADRANT OF RIGHT BREAST: ICD-10-CM

## 2022-06-22 DIAGNOSIS — Z01.419 WELL WOMAN EXAM: Primary | ICD-10-CM

## 2022-06-22 PROBLEM — F41.9 ANXIETY: Status: ACTIVE | Noted: 2022-03-14

## 2022-06-22 PROCEDURE — 99395 PREV VISIT EST AGE 18-39: CPT | Performed by: PHYSICIAN ASSISTANT

## 2022-06-22 PROCEDURE — G0145 SCR C/V CYTO,THINLAYER,RESCR: HCPCS | Performed by: PHYSICIAN ASSISTANT

## 2022-06-28 LAB
LAB AP GYN PRIMARY INTERPRETATION: NORMAL
Lab: NORMAL

## 2022-08-08 ENCOUNTER — HOSPITAL ENCOUNTER (OUTPATIENT)
Dept: MAMMOGRAPHY | Facility: CLINIC | Age: 37
Discharge: HOME | End: 2022-08-08
Payer: COMMERCIAL

## 2022-08-08 ENCOUNTER — HOSPITAL ENCOUNTER (OUTPATIENT)
Dept: ULTRASOUND IMAGING | Facility: CLINIC | Age: 37
Discharge: HOME | End: 2022-08-08
Payer: COMMERCIAL

## 2022-08-08 VITALS — BODY MASS INDEX: 32.65 KG/M2 | HEIGHT: 65 IN | WEIGHT: 196 LBS

## 2022-08-08 DIAGNOSIS — N63.12 MASS OF UPPER INNER QUADRANT OF RIGHT BREAST: ICD-10-CM

## 2022-08-08 PROCEDURE — G0279 TOMOSYNTHESIS, MAMMO: HCPCS

## 2022-08-08 PROCEDURE — 76642 ULTRASOUND BREAST LIMITED: CPT

## 2022-08-08 PROCEDURE — 77066 DX MAMMO INCL CAD BI: CPT

## 2023-01-11 NOTE — MISCELLANEOUS
To Whom it May Concern: The above named patient is under our care for pregnancy  She was evaluated in our office today  and she has an estimated due date ofJanuary 31st   Due to her pregnancy and her history, she is fit to fly and travel at this point in her pregnancy  Thank you for your consideration  in this matter      Sincerely,    [facility], OB[facility]yn PAST MEDICAL HISTORY:  ADHD (attention deficit hyperactivity disorder)     Anemia     Anxiety     Borderline personality disorder     Bradycardia     Cholecystitis S/P cholycystectomy    Chronic back pain     CKD (chronic kidney disease)     Depression     DM2 (diabetes mellitus, type 2)     Fractured coccyx     GERD (gastroesophageal reflux disease)     H/O appendicitis     HLD (hyperlipidemia)     HTN (hypertension)     Hypotension     Lymphocytosis     Mixed connective tissue disease     Nephrosclerosis     Obesity     Opiate dependence     PCOS (polycystic ovarian syndrome)     Polycystic ovarian syndrome     Suicide attempt by drug overdose 2019 Tylenol OD    Torn meniscus right knee    UTI (urinary tract infection)

## 2023-06-30 NOTE — PROGRESS NOTES
Assessment/Plan   Problem List Items Addressed This Visit    None  Visit Diagnoses     Well woman exam    -  Primary          Discussion  I have discussed the importance of monthly self-breast exams, exercise and healthy diet. Encourage safe sexual practices; STI testing - declines  Contraception - condoms    The current ASCCP guidelines were reviewed. Patient's last pap was 2022 and therefore, a pap with HPV cotesting is not indicated at this time. Breast cancer screening is not indicated at this time    All questions have been answered to her satisfaction  RTO for APE or sooner if needed      Subjective     HPI   Theodklaus Galvin is a 42 y.o. female who presents for annual well woman exam.        LMP - 06/22/23; Periods are reg q  28-30 days and last  5 days; No excessive bleeding; No intermenstrual bleeding or spotting; Cramps are tolerable. No vulvar itch/burn; No vaginal itch/burn; No abn discharge or odor; No urinary sx - burning/pain/frequency/hematuria    (+) SBEs - no breast masses, asymmetry, nipple discharge or bleeding, changes in skin of breast, or breast tenderness bilaterally    No abd/pelvic pain or HAs; No menopausal symptoms: No hot flashes/night sweats, problems with intercourse, vaginal dryness; sleeping well;     Pt is sexually active in a mutually monog/ sexual relationship; No issues with intercourse; She declines sti/hiv/hep testing; Feels safe at home. Current contraception: condoms   Condom use: yes      (+) PCP for routine Bw/care; Last Pap - 2022 NILM  History of abnormal Pap smear: yes, CIN1 in 2016; LEEP    Review of Systems   Constitutional: Negative for fatigue. Eyes: Negative for photophobia and visual disturbance. Respiratory: Negative for cough and shortness of breath. Cardiovascular: Negative for chest pain and palpitations. Gastrointestinal: Negative for abdominal pain, blood in stool, constipation, diarrhea, nausea and rectal pain. Genitourinary: Negative for dyspareunia, dysuria, flank pain, frequency, genital sores, menstrual problem, pelvic pain, urgency, vaginal bleeding, vaginal discharge and vaginal pain. Musculoskeletal: Negative for arthralgias and back pain. Skin: Negative for rash. Neurological: Negative for weakness and headaches.        The following portions of the patient's history were reviewed and updated as appropriate: allergies, current medications, past family history, past medical history, past social history, past surgical history and problem list.         OB History        2    Para   2    Term   2            AB        Living   2       SAB        IAB        Ectopic        Multiple   0    Live Births   2                 Past Medical History:   Diagnosis Date   • Abnormal Pap smear of cervix    • Anti-E isoimmunization affecting pregnancy, antepartum    • Anxiety     RESOLVED - PER PT    • Breast disorder     lumps   • Depression     Post partum depression   • HPV (human papilloma virus) infection    • Milia of eyelid    • Seasonal allergies    • Varicella     childhood       Past Surgical History:   Procedure Laterality Date   • APPENDECTOMY  2005   • CERVICAL BIOPSY N/A 2016    Procedure: LEEP ;  Surgeon: Alvin Oliva MD;  [facility];  Service:    • CERVICAL BIOPSY  W/ LOOP ELECTRODE EXCISION  2016   •  SECTION     • COLPOSCOPY     • FOOT SURGERY     • CO  DELIVERY ONLY N/A 2018    Procedure:  SECTION () REPEAT;  Surgeon: Alvin Oliva MD;  Location: [facility];  Service: Obstetrics       Family History   Problem Relation Age of Onset   • Cancer Mother         skin   • Depression Mother    • Hearing loss Mother    • Diabetes Father    • Early death Father    • Heart disease Father    • Hypertension Father    • Stroke Father    • No Known Problems Daughter    • Depression Maternal Grandmother    • No Known Problems Paternal Grandmother    • No Known Problems Paternal Aunt    • No Known Problems Paternal Aunt    • No Known Problems Paternal Aunt    • No Known Problems Paternal Aunt    • Hyperlipidemia Family    • Diabetes type II Family    • Breast cancer Neg Hx        Social History     Socioeconomic History   • Marital status: /Civil Union     Spouse name: Not on file   • Number of children: Not on file   • Years of education: Not on file   • Highest education level: Not on file   Occupational History   • Occupation: OCCUPATIONAL THERAPIST ASSISTANT   Tobacco Use   • Smoking status: Never   • Smokeless tobacco: Never   Vaping Use   • Vaping Use: Never used   Substance and Sexual Activity   • Alcohol use: Yes     Comment: social   • Drug use: No   • Sexual activity: Yes     Partners: Male     Birth control/protection: Condom Male     Comment: pt declines hiv/std testing   Other Topics Concern   • Not on file   Social History Narrative    CULTURAL BACKGROUND    FEELS SAFE AT HOME    MONOGAMOUS RELATIONSHIP    PRIMARY SPOKEN LANGUAGE IS ENGLISH    RACIAL BACKGROUND    SOCIAL        Daily caffeine use- 2 cups of coffee     Social Determinants of Health     Financial Resource Strain: Not on file   Food Insecurity: Not on file   Transportation Needs: Not on file   Physical Activity: Not on file   Stress: Not on file   Social Connections: Not on file   Intimate Partner Violence: Not on file   Housing Stability: Not on file         Current Outpatient Medications:   •  Cholecalciferol 125 MCG (5000 UT) capsule, Take 5,000 Units by mouth daily, Disp: , Rfl:   •  escitalopram (LEXAPRO) 10 mg tablet, 1/2 tablet by mouth daily with SUPPER FOR 10 DAYS THEN INCREASE TO 1 TABLET daily AT SUPPER, Disp: , Rfl:   •  fluticasone (FLONASE) 50 mcg/act nasal spray, 2 sprays into each nostril daily, Disp: 16 g, Rfl: 11  •  Wegovy 2.4 MG/0.75ML, INJECT 2.4 MG UNDER THE SKIN EVERY 7 DAYS, Disp: , Rfl:     Allergies   Allergen Reactions   • Other Cough   • Pollen Extract Cough       Objective   Vitals:    07/06/23 1320   BP: 132/84   BP Location: Left arm   Patient Position: Sitting   Cuff Size: Standard   Weight: 83.1 kg (183 lb 3.2 oz)   Height: 5' 5" (1.651 m)     Physical Exam  Vitals and nursing note reviewed. Constitutional:       Appearance: Normal appearance. She is well-developed and normal weight. HENT:      Head: Normocephalic and atraumatic. Eyes:      Conjunctiva/sclera: Conjunctivae normal.   Cardiovascular:      Rate and Rhythm: Normal rate and regular rhythm. Heart sounds: Normal heart sounds. Pulmonary:      Effort: Pulmonary effort is normal.      Breath sounds: Normal breath sounds. Chest:   Breasts:     Breasts are symmetrical.      Right: Normal. No inverted nipple, mass, nipple discharge, skin change or tenderness. Left: Normal. No inverted nipple, mass, nipple discharge, skin change or tenderness. Abdominal:      General: Abdomen is flat. There is no distension. Palpations: Abdomen is soft. There is no mass. Tenderness: There is no abdominal tenderness. There is no right CVA tenderness or left CVA tenderness. Genitourinary:     General: Normal vulva. Exam position: Lithotomy position. Pubic Area: No rash or pubic lice. Labia:         Right: No rash or tenderness. Left: No rash or tenderness. Urethra: No urethral pain. Vagina: Normal. No vaginal discharge. Cervix: Normal.      Uterus: Normal. No uterine prolapse. Adnexa: Right adnexa normal and left adnexa normal.        Right: No mass or tenderness. Left: No mass or tenderness. Musculoskeletal:         General: No tenderness. Normal range of motion. Cervical back: Normal range of motion. No tenderness. Right lower leg: No edema. Left lower leg: No edema. Lymphadenopathy:      Cervical: No cervical adenopathy. Upper Body:      Right upper body: No supraclavicular or axillary adenopathy.       Left upper body: No supraclavicular or axillary adenopathy. Lower Body: No right inguinal adenopathy. No left inguinal adenopathy. Skin:     General: Skin is warm and dry. Neurological:      Mental Status: She is alert and oriented to person, place, and time. Motor: No weakness. Psychiatric:         Mood and Affect: Mood normal.         Behavior: Behavior normal.         Thought Content: Thought content normal.         Judgment: Judgment normal.         There are no Patient Instructions on file for this visit.

## 2023-07-06 ENCOUNTER — ANNUAL EXAM (OUTPATIENT)
Dept: OBGYN CLINIC | Facility: CLINIC | Age: 38
End: 2023-07-06

## 2023-07-06 VITALS
WEIGHT: 183.2 LBS | BODY MASS INDEX: 30.52 KG/M2 | SYSTOLIC BLOOD PRESSURE: 132 MMHG | HEIGHT: 65 IN | DIASTOLIC BLOOD PRESSURE: 84 MMHG

## 2023-07-06 DIAGNOSIS — Z01.419 WELL WOMAN EXAM: Primary | ICD-10-CM

## 2023-07-06 RX ORDER — SEMAGLUTIDE 2.4 MG/.75ML
INJECTION, SOLUTION SUBCUTANEOUS
COMMUNITY
Start: 2023-06-15

## 2023-07-06 RX ORDER — ESCITALOPRAM OXALATE 10 MG/1
TABLET ORAL
COMMUNITY
Start: 2023-06-20

## 2024-02-21 PROBLEM — Z13.6 SCREENING FOR CARDIOVASCULAR CONDITION: Status: RESOLVED | Noted: 2018-02-02 | Resolved: 2024-02-21

## 2024-02-21 PROBLEM — Z01.419 ENCOUNTER FOR GYNECOLOGICAL EXAMINATION (GENERAL) (ROUTINE) WITHOUT ABNORMAL FINDINGS: Status: RESOLVED | Noted: 2020-06-10 | Resolved: 2024-02-21

## 2024-02-22 ENCOUNTER — HOSPITAL ENCOUNTER (OUTPATIENT)
Dept: MAMMOGRAPHY | Facility: CLINIC | Age: 39
End: 2024-02-22
Payer: COMMERCIAL

## 2024-02-22 VITALS — HEIGHT: 65 IN | BODY MASS INDEX: 29.66 KG/M2 | WEIGHT: 178 LBS

## 2024-02-22 DIAGNOSIS — Z12.31 ENCOUNTER FOR SCREENING MAMMOGRAM FOR MALIGNANT NEOPLASM OF BREAST: ICD-10-CM

## 2024-02-22 PROCEDURE — 77063 BREAST TOMOSYNTHESIS BI: CPT

## 2024-02-22 PROCEDURE — 77067 SCR MAMMO BI INCL CAD: CPT

## 2024-03-20 ENCOUNTER — HOSPITAL ENCOUNTER (OUTPATIENT)
Dept: ULTRASOUND IMAGING | Facility: CLINIC | Age: 39
Discharge: HOME | End: 2024-03-20
Payer: COMMERCIAL

## 2024-03-20 ENCOUNTER — HOSPITAL ENCOUNTER (OUTPATIENT)
Dept: MAMMOGRAPHY | Facility: CLINIC | Age: 39
Discharge: HOME | End: 2024-03-20
Payer: COMMERCIAL

## 2024-03-20 VITALS — HEIGHT: 65 IN | BODY MASS INDEX: 29.66 KG/M2 | WEIGHT: 178 LBS

## 2024-03-20 DIAGNOSIS — R92.8 ABNORMAL MAMMOGRAM: ICD-10-CM

## 2024-03-20 PROCEDURE — G0279 TOMOSYNTHESIS, MAMMO: HCPCS

## 2024-03-20 PROCEDURE — 77065 DX MAMMO INCL CAD UNI: CPT

## 2024-03-20 PROCEDURE — 76642 ULTRASOUND BREAST LIMITED: CPT

## 2024-03-20 NOTE — PROGRESS NOTES
Met with patient and Dr. Surya Cook regarding recommendation for;    __X___ RIGHT ______LEFT      ___X__Ultrasound guided  ______Stereotactic breast biopsy.      __X___Verbalized understanding.      Blood thinners:  No: ___X__ Yes: ______ What:                 Biopsy teaching sheet given:  Yes: ___X___ No: ________    Pt given contact information and adv to call with any questions/needs    Patient advised to arrive at 0845 for a 0900 appointment

## 2024-03-23 ENCOUNTER — HOSPITAL ENCOUNTER (OUTPATIENT)
Dept: ULTRASOUND IMAGING | Facility: CLINIC | Age: 39
Discharge: HOME | End: 2024-03-23
Payer: COMMERCIAL

## 2024-03-23 ENCOUNTER — HOSPITAL ENCOUNTER (OUTPATIENT)
Dept: MAMMOGRAPHY | Facility: CLINIC | Age: 39
Discharge: HOME | End: 2024-03-23

## 2024-03-23 VITALS — SYSTOLIC BLOOD PRESSURE: 136 MMHG | HEART RATE: 72 BPM | DIASTOLIC BLOOD PRESSURE: 83 MMHG

## 2024-03-23 DIAGNOSIS — R92.8 ABNORMAL MAMMOGRAM: ICD-10-CM

## 2024-03-23 PROCEDURE — A4648 IMPLANTABLE TISSUE MARKER: HCPCS

## 2024-03-23 PROCEDURE — 19083 BX BREAST 1ST LESION US IMAG: CPT

## 2024-03-23 PROCEDURE — 88305 TISSUE EXAM BY PATHOLOGIST: CPT | Performed by: PATHOLOGY

## 2024-03-23 RX ORDER — LIDOCAINE HYDROCHLORIDE 10 MG/ML
5 INJECTION, SOLUTION EPIDURAL; INFILTRATION; INTRACAUDAL; PERINEURAL ONCE
Status: COMPLETED | OUTPATIENT
Start: 2024-03-23 | End: 2024-03-23

## 2024-03-23 RX ADMIN — LIDOCAINE HYDROCHLORIDE 5 ML: 10 INJECTION, SOLUTION EPIDURAL; INFILTRATION; INTRACAUDAL; PERINEURAL at 09:09

## 2024-03-23 NOTE — PROGRESS NOTES
Ice pack given:    __X___yes _____no    Discharge instructions reviewed and given to patient:    __X___yes _____no    Discharged via:    __X___amulatory    _____wheelchair    _____stretcher    Biopsy site clean and dry with no bleeding on discharge:    __X___yes ____no  Patient would like results over the phone.  Ok to leave a message.

## 2024-03-23 NOTE — PROGRESS NOTES
Procedure type:    ___x__ultrasound guided _____stereotactic    Breast:    _____Left __x___Right    Location: 1 o'clock 8 cmfn     Needle: 12 gauge lauri     # of passes: 3    Clip: Securewinsome Jacobs     Performed by: Dr. Surya Cook     Pressure held for 5 minutes by: Leny Dwyer     Sterarabella Strips:    ___x__yes _____no    Band aid:    __x___yes_____no    Tape and guaze:    _____yes __x___no    Tolerated procedure:    __x___yes _____no

## 2024-03-26 PROCEDURE — 88305 TISSUE EXAM BY PATHOLOGIST: CPT | Performed by: PATHOLOGY

## 2024-03-26 NOTE — PROGRESS NOTES
Post procedure call completed    Bleeding: _____yes __X___no (pt denies)    Pain: _____yes ___X___no (pt denies, used ice)    Redness/Swelling: ______yes ___X___no (pt denies)    Band aid removed: _____yes _____no (not assessed)    Steri-Strips intact: ___X___yes _____no     Pt with no questions at this time, adv will call when results available, adv to call with any questions or concerns, has name/# for contact

## 2024-03-29 ENCOUNTER — TELEPHONE (OUTPATIENT)
Dept: MAMMOGRAPHY | Facility: CLINIC | Age: 39
End: 2024-03-29

## 2024-03-29 NOTE — TELEPHONE ENCOUNTER
Pt given benign breast biopsy results, adv to return to routine yearly screening, also discussed with pt to have screening breast MRI d/t higher risk on 21.02% lifetime Michael, pt states understanding, adv pt OB can order this for her, pt with no further questions at this time, has name/# for any addtional needs

## 2024-03-31 DIAGNOSIS — Z91.89 INCREASED RISK OF BREAST CANCER: Primary | ICD-10-CM

## 2024-03-31 PROBLEM — K76.0 FATTY LIVER: Status: ACTIVE | Noted: 2023-01-05

## 2024-07-16 ENCOUNTER — ANNUAL EXAM (OUTPATIENT)
Dept: GYNECOLOGY | Facility: CLINIC | Age: 39
End: 2024-07-16
Payer: COMMERCIAL

## 2024-07-16 VITALS
BODY MASS INDEX: 30.82 KG/M2 | DIASTOLIC BLOOD PRESSURE: 80 MMHG | SYSTOLIC BLOOD PRESSURE: 124 MMHG | HEIGHT: 65 IN | WEIGHT: 185 LBS

## 2024-07-16 DIAGNOSIS — Z12.39 ENCOUNTER FOR SCREENING BREAST EXAMINATION: ICD-10-CM

## 2024-07-16 DIAGNOSIS — Z01.419 ENCOUNTER FOR WELL WOMAN EXAM: Primary | ICD-10-CM

## 2024-07-16 PROCEDURE — S0612 ANNUAL GYNECOLOGICAL EXAMINA: HCPCS | Performed by: PHYSICIAN ASSISTANT

## 2024-07-16 NOTE — PROGRESS NOTES
Assessment/Plan:      Diagnoses and all orders for this visit:    Encounter for well woman exam    Encounter for screening breast examination          Subjective:     Patient ID: Nadege Amanda is a 38 y.o. female.    Pt presents for her annual exam today--  She has no complaints  She has regular bleeding  no pelvic pain  Bowel and bladder are regular  Colonoscopy--  No breast concerns today  Last mammo--24  S/p bx--benign  Fam h/o dense breasts    No pap today.    Rx mammo  Daily mvi        Review of Systems   Constitutional:  Negative for chills, fever and unexpected weight change.   HENT:  Negative for ear pain and sore throat.    Eyes:  Negative for pain and visual disturbance.   Respiratory:  Negative for cough and shortness of breath.    Cardiovascular:  Negative for chest pain and palpitations.   Gastrointestinal:  Negative for abdominal pain, blood in stool, constipation, diarrhea and vomiting.   Genitourinary: Negative.  Negative for dysuria and hematuria.   Musculoskeletal:  Negative for arthralgias and back pain.   Skin:  Negative for color change and rash.   Neurological:  Negative for seizures and syncope.   All other systems reviewed and are negative.        Objective:     Physical Exam  Vitals and nursing note reviewed.   Constitutional:       Appearance: Normal appearance. She is well-developed.   HENT:      Head: Normocephalic and atraumatic.   Chest:   Breasts:     Right: No inverted nipple, mass, nipple discharge or skin change.      Left: No inverted nipple, mass, nipple discharge or skin change.   Abdominal:      Palpations: Abdomen is soft.   Genitourinary:     Exam position: Supine.      Labia:         Right: No rash, tenderness or lesion.         Left: No rash, tenderness or lesion.       Vagina: Normal.      Cervix: No cervical motion tenderness, discharge or friability.      Adnexa:         Right: No mass, tenderness or fullness.          Left: No mass, tenderness or fullness.      Musculoskeletal:      Cervical back: Normal range of motion.   Lymphadenopathy:      Lower Body: No right inguinal adenopathy. No left inguinal adenopathy.   Neurological:      Mental Status: She is alert.

## 2024-08-20 ENCOUNTER — HOSPITAL ENCOUNTER (OUTPATIENT)
Dept: MRI IMAGING | Facility: HOSPITAL | Age: 39
Discharge: HOME | End: 2024-08-20
Payer: COMMERCIAL

## 2024-08-20 DIAGNOSIS — Z91.89 INCREASED RISK OF BREAST CANCER: ICD-10-CM

## 2024-08-20 PROCEDURE — C8937 CAD BREAST MRI: HCPCS

## 2024-08-20 PROCEDURE — C8908 MRI W/O FOL W/CONT, BREAST,: HCPCS

## 2024-08-20 PROCEDURE — A9585 GADOBUTROL INJECTION: HCPCS | Performed by: PHYSICIAN ASSISTANT

## 2024-08-20 RX ORDER — GADOBUTROL 604.72 MG/ML
8 INJECTION INTRAVENOUS
Status: COMPLETED | OUTPATIENT
Start: 2024-08-20 | End: 2024-08-20

## 2024-08-20 RX ADMIN — GADOBUTROL 8 ML: 604.72 INJECTION INTRAVENOUS at 11:10

## 2025-01-24 DIAGNOSIS — Z12.31 ENCOUNTER FOR SCREENING MAMMOGRAM FOR MALIGNANT NEOPLASM OF BREAST: Primary | ICD-10-CM

## 2025-04-21 ENCOUNTER — HOSPITAL ENCOUNTER (OUTPATIENT)
Dept: RADIOLOGY | Facility: IMAGING CENTER | Age: 40
Discharge: HOME | End: 2025-04-21
Payer: COMMERCIAL

## 2025-04-21 VITALS — HEIGHT: 65 IN | WEIGHT: 175 LBS | BODY MASS INDEX: 29.16 KG/M2

## 2025-04-21 DIAGNOSIS — Z12.31 ENCOUNTER FOR SCREENING MAMMOGRAM FOR MALIGNANT NEOPLASM OF BREAST: ICD-10-CM

## 2025-04-21 PROCEDURE — 77067 SCR MAMMO BI INCL CAD: CPT

## 2025-04-21 PROCEDURE — 77063 BREAST TOMOSYNTHESIS BI: CPT

## 2025-04-27 ENCOUNTER — RESULTS FOLLOW-UP (OUTPATIENT)
Dept: OBGYN CLINIC | Facility: CLINIC | Age: 40
End: 2025-04-27

## 2025-04-28 DIAGNOSIS — R92.30 DENSE BREASTS: Primary | ICD-10-CM

## 2025-05-29 ENCOUNTER — TELEPHONE (OUTPATIENT)
Dept: OBGYN CLINIC | Facility: CLINIC | Age: 40
End: 2025-05-29

## (undated) DEVICE — GLOVE SRG BIOGEL ECLIPSE 6.5

## (undated) DEVICE — CHLORAPREP HI-LITE 26ML ORANGE

## (undated) DEVICE — TELFA NON-ADHERENT ABSORBENT DRESSING: Brand: TELFA

## (undated) DEVICE — GLOVE INDICATOR PI UNDERGLOVE SZ 6.5 BLUE

## (undated) DEVICE — GAUZE SPONGES,16 PLY: Brand: CURITY

## (undated) DEVICE — SURGICEL 2 X 3

## (undated) DEVICE — Device

## (undated) DEVICE — DRESSING TELFA 2 X 3 IN STRL

## (undated) DEVICE — SKIN MARKER DUAL TIP WITH RULER CAP, FLEXIBLE RULER AND LABELS: Brand: DEVON

## (undated) DEVICE — PROXIMATE PLUS MD MULTI-DIRECTIONAL RELEASE SKIN STAPLERS CONTAINS 35 STAINLESS STEEL STAPLES APPROXIMATE CLOSED DIMENSIONS: 6.9MM X 3.9MM WIDE: Brand: PROXIMATE

## (undated) DEVICE — SUT VICRYL 0 CT-1 27 IN J260H

## (undated) DEVICE — SUT MONOCRYL 0 36 IN UNDYED Y946H

## (undated) DEVICE — ABDOMINAL PAD: Brand: DERMACEA

## (undated) DEVICE — PACK C-SECTION PBDS